# Patient Record
Sex: MALE | Race: WHITE | NOT HISPANIC OR LATINO | Employment: FULL TIME | URBAN - NONMETROPOLITAN AREA
[De-identification: names, ages, dates, MRNs, and addresses within clinical notes are randomized per-mention and may not be internally consistent; named-entity substitution may affect disease eponyms.]

---

## 2020-09-04 ENCOUNTER — TELEMEDICINE (OUTPATIENT)
Dept: FAMILY MEDICINE CLINIC | Facility: CLINIC | Age: 25
End: 2020-09-04

## 2020-09-04 ENCOUNTER — TELEPHONE (OUTPATIENT)
Dept: FAMILY MEDICINE CLINIC | Facility: CLINIC | Age: 25
End: 2020-09-04

## 2020-09-04 VITALS — TEMPERATURE: 99.1 F | BODY MASS INDEX: 18 KG/M2 | HEIGHT: 71 IN | WEIGHT: 128.6 LBS

## 2020-09-04 DIAGNOSIS — R06.2 WHEEZING: ICD-10-CM

## 2020-09-04 DIAGNOSIS — J01.00 ACUTE NON-RECURRENT MAXILLARY SINUSITIS: Primary | ICD-10-CM

## 2020-09-04 DIAGNOSIS — R06.02 SHORTNESS OF BREATH: ICD-10-CM

## 2020-09-04 PROCEDURE — 99214 OFFICE O/P EST MOD 30 MIN: CPT | Performed by: NURSE PRACTITIONER

## 2020-09-04 RX ORDER — PREDNISONE 20 MG/1
20 TABLET ORAL DAILY
Qty: 5 TABLET | Refills: 0 | Status: SHIPPED | OUTPATIENT
Start: 2020-09-04 | End: 2020-09-09

## 2020-09-04 RX ORDER — BENZONATATE 100 MG/1
100 CAPSULE ORAL 3 TIMES DAILY PRN
Qty: 40 CAPSULE | Refills: 0 | Status: SHIPPED | OUTPATIENT
Start: 2020-09-04 | End: 2020-12-01

## 2020-09-04 RX ORDER — AZITHROMYCIN 250 MG/1
TABLET, FILM COATED ORAL
Qty: 6 TABLET | Refills: 0 | Status: SHIPPED | OUTPATIENT
Start: 2020-09-04 | End: 2020-12-01

## 2020-09-04 RX ORDER — ALBUTEROL SULFATE 90 UG/1
2 AEROSOL, METERED RESPIRATORY (INHALATION) EVERY 4 HOURS PRN
Qty: 1 G | Refills: 0 | Status: SHIPPED | OUTPATIENT
Start: 2020-09-04 | End: 2020-12-01

## 2020-09-04 NOTE — PATIENT INSTRUCTIONS
Sinusitis, Adult  Sinusitis is inflammation of your sinuses. Sinuses are hollow spaces in the bones around your face. Your sinuses are located:  · Around your eyes.  · In the middle of your forehead.  · Behind your nose.  · In your cheekbones.  Mucus normally drains out of your sinuses. When your nasal tissues become inflamed or swollen, mucus can become trapped or blocked. This allows bacteria, viruses, and fungi to grow, which leads to infection. Most infections of the sinuses are caused by a virus.  Sinusitis can develop quickly. It can last for up to 4 weeks (acute) or for more than 12 weeks (chronic). Sinusitis often develops after a cold.  What are the causes?  This condition is caused by anything that creates swelling in the sinuses or stops mucus from draining. This includes:  · Allergies.  · Asthma.  · Infection from bacteria or viruses.  · Deformities or blockages in your nose or sinuses.  · Abnormal growths in the nose (nasal polyps).  · Pollutants, such as chemicals or irritants in the air.  · Infection from fungi (rare).  What increases the risk?  You are more likely to develop this condition if you:  · Have a weak body defense system (immune system).  · Do a lot of swimming or diving.  · Overuse nasal sprays.  · Smoke.  What are the signs or symptoms?  The main symptoms of this condition are pain and a feeling of pressure around the affected sinuses. Other symptoms include:  · Stuffy nose or congestion.  · Thick drainage from your nose.  · Swelling and warmth over the affected sinuses.  · Headache.  · Upper toothache.  · A cough that may get worse at night.  · Extra mucus that collects in the throat or the back of the nose (postnasal drip).  · Decreased sense of smell and taste.  · Fatigue.  · A fever.  · Sore throat.  · Bad breath.  How is this diagnosed?  This condition is diagnosed based on:  · Your symptoms.  · Your medical history.  · A physical exam.  · Tests to find out if your condition is  acute or chronic. This may include:  ? Checking your nose for nasal polyps.  ? Viewing your sinuses using a device that has a light (endoscope).  ? Testing for allergies or bacteria.  ? Imaging tests, such as an MRI or CT scan.  In rare cases, a bone biopsy may be done to rule out more serious types of fungal sinus disease.  How is this treated?  Treatment for sinusitis depends on the cause and whether your condition is chronic or acute.  · If caused by a virus, your symptoms should go away on their own within 10 days. You may be given medicines to relieve symptoms. They include:  ? Medicines that shrink swollen nasal passages (topical intranasal decongestants).  ? Medicines that treat allergies (antihistamines).  ? A spray that eases inflammation of the nostrils (topical intranasal corticosteroids).  ? Rinses that help get rid of thick mucus in your nose (nasal saline washes).  · If caused by bacteria, your health care provider may recommend waiting to see if your symptoms improve. Most bacterial infections will get better without antibiotic medicine. You may be given antibiotics if you have:  ? A severe infection.  ? A weak immune system.  · If caused by narrow nasal passages or nasal polyps, you may need to have surgery.  Follow these instructions at home:  Medicines  · Take, use, or apply over-the-counter and prescription medicines only as told by your health care provider. These may include nasal sprays.  · If you were prescribed an antibiotic medicine, take it as told by your health care provider. Do not stop taking the antibiotic even if you start to feel better.  Hydrate and humidify    · Drink enough fluid to keep your urine pale yellow. Staying hydrated will help to thin your mucus.  · Use a cool mist humidifier to keep the humidity level in your home above 50%.  · Inhale steam for 10-15 minutes, 3-4 times a day, or as told by your health care provider. You can do this in the bathroom while a hot shower is  running.  · Limit your exposure to cool or dry air.  Rest  · Rest as much as possible.  · Sleep with your head raised (elevated).  · Make sure you get enough sleep each night.  General instructions    · Apply a warm, moist washcloth to your face 3-4 times a day or as told by your health care provider. This will help with discomfort.  · Wash your hands often with soap and water to reduce your exposure to germs. If soap and water are not available, use hand .  · Do not smoke. Avoid being around people who are smoking (secondhand smoke).  · Keep all follow-up visits as told by your health care provider. This is important.  Contact a health care provider if:  · You have a fever.  · Your symptoms get worse.  · Your symptoms do not improve within 10 days.  Get help right away if:  · You have a severe headache.  · You have persistent vomiting.  · You have severe pain or swelling around your face or eyes.  · You have vision problems.  · You develop confusion.  · Your neck is stiff.  · You have trouble breathing.  Summary  · Sinusitis is soreness and inflammation of your sinuses. Sinuses are hollow spaces in the bones around your face.  · This condition is caused by nasal tissues that become inflamed or swollen. The swelling traps or blocks the flow of mucus. This allows bacteria, viruses, and fungi to grow, which leads to infection.  · If you were prescribed an antibiotic medicine, take it as told by your health care provider. Do not stop taking the antibiotic even if you start to feel better.  · Keep all follow-up visits as told by your health care provider. This is important.  This information is not intended to replace advice given to you by your health care provider. Make sure you discuss any questions you have with your health care provider.  Document Released: 12/18/2006 Document Revised: 05/20/2019 Document Reviewed: 05/20/2019  Elsevier Patient Education © 2020 [employer]

## 2020-09-04 NOTE — TELEPHONE ENCOUNTER
PATIENT CALLING IN REQUESTING ALL OF THE MEDICATIONS HE WAS GIVEN TODAY GO TO [facility] [address] [place] [address] [id number] - [phone] [facility] [phone]   [phone]    OTHER PHARMACY DOSE NOT HAVE THE REQUEST.

## 2020-09-04 NOTE — PROGRESS NOTES
CC: Right earache, jaw pain, sinus pain and pressure.       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  Has tried benadryl and zyrtec/clariten with little improvement.  Says he does dip every none and then. Says that as a child had tubes several times and recurrent ear infections.  Has not been blowing anything out of nose, cheeks are sore and tender.  He does admit to dipping some.  Pt says that he has not been exposed to covid since he was tested in August which was negative.          The following portions of the patient's history were reviewed and updated as appropriate: allergies, current medications, past family history, past medical history, past social history, past surgical history and problem list.    Review of Systems   Constitutional: Positive for activity change, appetite change and fever.   HENT: Positive for congestion, ear pain, postnasal drip and sinus pressure. Negative for sore throat and swollen glands.    Eyes: Negative.    Respiratory: Positive for shortness of breath and wheezing. Negative for cough.    Musculoskeletal: Negative.    Skin: Negative.    Allergic/Immunologic: Negative.    Neurological: Negative.    Hematological: Negative.    Psychiatric/Behavioral: Negative.    All other systems reviewed and are negative.      Objective      Physical exam-  · General appearance- Alert, appears comfortable. Dressed appropriately. No distress.   · HEENT- external examination of eyes, ears and nose all normal. Head is atraumatic. Hearing normal.  Taps on cheeks says that hurts both sides, says it hurts under his right ear into the jaw.    · Neck is symmetric, trachea midline.   · Normal respiratory effort.   · Digits and nails appear healthy. No clubbing, rashes or discolorations.   · Normal range of motion of all extremities.    · Mood appropriate. Communicates easily. Normal judgement and insight. Oriented to time, place and person. Memory appears intact.   · Says he is wheezing           Assessment/Plan   [place] was seen today for shortness of breath, sinus problem and earache.    Diagnoses and all orders for this visit:    Acute non-recurrent maxillary sinusitis  -     azithromycin (ZITHROMAX) 250 MG tablet; Take 2 tablets the first day, then 1 tablet daily for 4 days.    Wheezing  -     benzonatate (Tessalon Perles) 100 MG capsule; Take 1 capsule by mouth 3 (Three) Times a Day As Needed for Cough.  -     predniSONE (DELTASONE) 20 MG tablet; Take 1 tablet by mouth Daily for 5 days.    Shortness of breath  -     albuterol sulfate  (90 Base) MCG/ACT inhaler; Inhale 2 puffs Every 4 (Four) Hours As Needed for Wheezing.      This was an audio and video enabled telemedicine encounter.    Return in about 1 week (around 9/11/2020), or if symptoms worsen or fail to improve.    Electronically signed by Swetha Jules, AMENA, APRN, 09/04/20, 3:44 PM.

## 2020-12-01 ENCOUNTER — TELEMEDICINE (OUTPATIENT)
Dept: FAMILY MEDICINE CLINIC | Facility: CLINIC | Age: 25
End: 2020-12-01

## 2020-12-01 ENCOUNTER — TELEPHONE (OUTPATIENT)
Dept: FAMILY MEDICINE CLINIC | Facility: CLINIC | Age: 25
End: 2020-12-01

## 2020-12-01 VITALS — HEIGHT: 71 IN | TEMPERATURE: 98.6 F | WEIGHT: 128.6 LBS | BODY MASS INDEX: 18 KG/M2

## 2020-12-01 DIAGNOSIS — Z20.822 CLOSE EXPOSURE TO COVID-19 VIRUS: Primary | ICD-10-CM

## 2020-12-01 PROCEDURE — 99213 OFFICE O/P EST LOW 20 MIN: CPT | Performed by: NURSE PRACTITIONER

## 2020-12-01 NOTE — TELEPHONE ENCOUNTER
Caller: Jenni Phelan    Relationship to patient: Emergency Contact    Best call back number: [phone]    Concerns or Questions if Applicable: PATIENT WOULD LIKE TO KNOW IF HE NEEDS A TEST OR IF HE NEEDS TO QUARANTINE     Travel screen questions: EXPOSURE TO WIFE WHO HAS COVID.

## 2020-12-02 ENCOUNTER — CLINICAL SUPPORT (OUTPATIENT)
Dept: FAMILY MEDICINE CLINIC | Facility: CLINIC | Age: 25
End: 2020-12-02

## 2020-12-02 DIAGNOSIS — Z20.822 CLOSE EXPOSURE TO COVID-19 VIRUS: ICD-10-CM

## 2020-12-04 LAB — SARS-COV-2 RNA RESP QL NAA+PROBE: DETECTED

## 2022-03-07 ENCOUNTER — OFFICE VISIT (OUTPATIENT)
Dept: FAMILY MEDICINE CLINIC | Facility: CLINIC | Age: 27
End: 2022-03-07

## 2022-03-07 VITALS
DIASTOLIC BLOOD PRESSURE: 79 MMHG | TEMPERATURE: 97.2 F | BODY MASS INDEX: 18.9 KG/M2 | RESPIRATION RATE: 18 BRPM | OXYGEN SATURATION: 98 % | HEIGHT: 71 IN | SYSTOLIC BLOOD PRESSURE: 149 MMHG | WEIGHT: 135 LBS | HEART RATE: 81 BPM

## 2022-03-07 DIAGNOSIS — Z53.21 PATIENT LEFT WITHOUT BEING SEEN: Primary | ICD-10-CM

## 2022-03-07 NOTE — PROGRESS NOTES
"Chief Complaint  Annual Exam and Discuss Semen Analysis     Patient left without being seen          Subjective    History of Present Illness      Patient presents to [facility] for   anual exam and to discuss semen analysis. He also complains of bilateral knee pain, bilateral wrist pain, bilateral ankle pain, neck and back pain.        Review of Systems   Constitutional: Negative.    HENT: Negative.    Eyes: Negative.    Respiratory: Negative.    Cardiovascular: Negative.    Gastrointestinal: Negative.    Endocrine: Negative.    Genitourinary: Negative.    Musculoskeletal: Positive for arthralgias, back pain, myalgias and neck pain.   Skin: Negative.    Allergic/Immunologic: Negative.    Neurological: Negative.    Hematological: Negative.    Psychiatric/Behavioral: Negative.          Objective   Vital Signs:   /79   Pulse 81   Temp 97.2 °F (36.2 °C)   Resp 18   Ht 180.3 cm (71\")   Wt 61.2 kg (135 lb)   SpO2 98%   BMI 18.83 kg/m²           Physical Exam     Result Review  Data Reviewed:                   Assessment and Plan      Problem List Items Addressed This Visit    None     Visit Diagnoses     Patient left without being seen    -  Primary              Follow Up   No follow-ups on file.  Patient was given instructions and counseling regarding his condition or for health maintenance advice. Please see specific information pulled into the AVS if appropriate.       "

## 2022-05-02 ENCOUNTER — OFFICE VISIT (OUTPATIENT)
Dept: FAMILY MEDICINE CLINIC | Facility: CLINIC | Age: 27
End: 2022-05-02

## 2022-05-02 VITALS
BODY MASS INDEX: 18.62 KG/M2 | RESPIRATION RATE: 16 BRPM | SYSTOLIC BLOOD PRESSURE: 118 MMHG | DIASTOLIC BLOOD PRESSURE: 72 MMHG | OXYGEN SATURATION: 99 % | HEART RATE: 83 BPM | WEIGHT: 133 LBS | TEMPERATURE: 98.2 F | HEIGHT: 71 IN

## 2022-05-02 DIAGNOSIS — N46.9 INFERTILITY MALE: ICD-10-CM

## 2022-05-02 DIAGNOSIS — F32.A ANXIETY AND DEPRESSION: Primary | ICD-10-CM

## 2022-05-02 DIAGNOSIS — B07.0 PLANTAR WART: ICD-10-CM

## 2022-05-02 DIAGNOSIS — Z13.0 SCREENING FOR DEFICIENCY ANEMIA: ICD-10-CM

## 2022-05-02 DIAGNOSIS — Z13.1 DIABETES MELLITUS SCREENING: ICD-10-CM

## 2022-05-02 DIAGNOSIS — F41.9 ANXIETY AND DEPRESSION: Primary | ICD-10-CM

## 2022-05-02 DIAGNOSIS — M79.10 MYALGIA: ICD-10-CM

## 2022-05-02 DIAGNOSIS — Z13.220 LIPID SCREENING: ICD-10-CM

## 2022-05-02 PROCEDURE — 99214 OFFICE O/P EST MOD 30 MIN: CPT | Performed by: FAMILY MEDICINE

## 2022-05-02 PROCEDURE — 17110 DESTRUCTION B9 LES UP TO 14: CPT | Performed by: FAMILY MEDICINE

## 2022-05-02 RX ORDER — FLUOXETINE HYDROCHLORIDE 20 MG/1
20 CAPSULE ORAL DAILY
Qty: 90 CAPSULE | Refills: 0 | Status: SHIPPED | OUTPATIENT
Start: 2022-05-02 | End: 2022-07-29

## 2022-05-02 NOTE — PROGRESS NOTES
"Subjective cc: est care for HTN  [place] Lu[place] Phelan is a 26 y.o. male.     History of Present Illness   Terrence Phelan, date of birth 1995, presents today with a checklist to evaluate regarding blood pressure, mood stabilizer, semen analysis. He also has some places on his hand, elbow and his calf. The patient states he has pain on extending his foot and is unsure if it is a muscle issue or shin splints. He reports he is working a new job and is unsure if it is related to that.    His blood pressure today is 118/72 mmHg, but has been elevated in the past. He reports hypertension runs in his family and would like to keep a check on it.     Regarding semen analysis, he reports they have been trying to get pregnant for 2.5 years. His wife has never had a fertility test. He would like a test be setup for him. He and his wife have a child but he is unsure if it is biologically his.     The patient reports he has a wart on the middle finger and on the palm of his left hand that he is constantly hitting and causing pain and irritation. He would like to have them removed.      The patient reports an over-stretched ligament-type pain in his calf. When he extends his foot it causes pain. It only hurts when he is walking around. He has had this pain for 3.5 years randomly and thought it might be dehydration. 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     He reports depression and anxiety and going from 0 to 100 over little things. The patient reports he had a traumatic childhood, but it has worsened in the last 2 years after finding out about his wife's affair. He reports depression and really bad mood swings. The patient reports he was on a mood stabilizer in the past through the [facility], but cannot recall the name of the medicine, but notes it increased his temper. He reports thoughts of harming himself but only when it is \"bad, bad,\" and " "that could be once a month or once every 3 months. The patient is not currently having thoughts of harming himself. He denies any suicide attempts in the past or self harm. The patient is not currently seeing a counselor, but has seen one in the past, but did not find it helpful. He is agreeable to taking Prozac.    He is fasting today for lab studies.    The following portions of the patient's history were reviewed and updated as appropriate: allergies, current medications, past family history, past medical history, past social history, past surgical history and problem list.        Review of Systems   Musculoskeletal: Positive for myalgias.   Skin:        wart   Psychiatric/Behavioral: Positive for agitation and dysphoric mood. The patient is nervous/anxious.    All other systems reviewed and are negative.      Objective   Blood pressure 118/72, pulse 83, temperature 98.2 °F (36.8 °C), temperature source Infrared, resp. rate 16, height 180.3 cm (71\"), weight 60.3 kg (133 lb), SpO2 99 %.  Physical Exam  Vitals and nursing note reviewed.   Constitutional:       General: He is not in acute distress.     Appearance: He is well-developed. He is not diaphoretic.   HENT:      Head: Normocephalic and atraumatic.      Right Ear: External ear normal.      Left Ear: External ear normal.      Nose: Nose normal.   Eyes:      General:         Right eye: No discharge.         Left eye: No discharge.      Conjunctiva/sclera: Conjunctivae normal.   Neck:      Thyroid: No thyromegaly.      Trachea: No tracheal deviation.   Cardiovascular:      Rate and Rhythm: Normal rate and regular rhythm.      Heart sounds: Normal heart sounds.   Pulmonary:      Effort: Pulmonary effort is normal. No respiratory distress.      Breath sounds: Normal breath sounds. No stridor. No wheezing.   Chest:      Chest wall: No tenderness.   Abdominal:      General: There is no distension.      Palpations: Abdomen is soft.      Tenderness: There is no " abdominal tenderness.   Musculoskeletal:         General: Normal range of motion.        Hands:       Cervical back: Normal range of motion.   Lymphadenopathy:      Cervical: No cervical adenopathy.   Skin:     General: Skin is warm and dry.   Neurological:      Mental Status: He is alert and oriented to person, place, and time.      Motor: No abnormal muscle tone.      Coordination: Coordination normal.   Psychiatric:         Behavior: Behavior normal.         Thought Content: Thought content normal.         Judgment: Judgment normal.     Cryotherapy, Skin Lesion    Date/Time: 5/2/2022 5:10 PM  Performed by: Eliane Victoria MD  Authorized by: Eliane Victoria MD   Consent: Verbal consent obtained. Written consent obtained.  Risks and benefits: risks, benefits and alternatives were discussed  Consent given by: patient  Patient understanding: patient states understanding of the procedure being performed  Patient consent: the patient's understanding of the procedure matches consent given  Procedure consent: procedure consent matches procedure scheduled  Patient identity confirmed: verbally with patient  Local anesthesia used: no    Anesthesia:  Local anesthesia used: no    Sedation:  Patient sedated: no    Patient tolerance: patient tolerated the procedure well with no immediate complications  Comments: Liquid nitrogen application x 3 with complete freeze and thaw between each             Assessment/Plan   Problems Addressed this Visit        Mental Health    Anxiety and depression - Primary    Relevant Medications    FLUoxetine (PROzac) 20 MG capsule    Other Relevant Orders    TSH    T4, Free      Other Visit Diagnoses     Infertility male        Relevant Orders    Semen Analysis - Semen, Voided    Testosterone    Plantar wart        Myalgia        Screening for deficiency anemia        Relevant Orders    CBC (No Diff)    Lipid screening        Relevant Orders    Lipid Panel    Diabetes mellitus screening         Relevant Orders    Comprehensive Metabolic Panel      Diagnoses       Codes Comments    Anxiety and depression    -  Primary ICD-10-CM: F41.9, F32.A  ICD-9-CM: 300.00, 311     Infertility male     ICD-10-CM: N46.9  ICD-9-CM: 606.9     Plantar wart     ICD-10-CM: B07.0  ICD-9-CM: 078.12     Myalgia     ICD-10-CM: M79.10  ICD-9-CM: 729.1     Screening for deficiency anemia     ICD-10-CM: Z13.0  ICD-9-CM: V78.1     Lipid screening     ICD-10-CM: Z13.220  ICD-9-CM: V77.91     Diabetes mellitus screening     ICD-10-CM: Z13.1  ICD-9-CM: V77.1         1. Anxiety and depression and agitation: New to me, chronic for patient.   · The patient reports he has been on treatment in the past; however, it was not effective. Discussed treatment options.  · We will start patient on Prozac. Advised patient on risks and benefits of medication. Stop medication and contact the office if he has any negative side effects; otherwise, recheck in 6 weeks for reevaluation.   · The patient does not want counseling.     2. Infertility: New, needs further evaluation.   · The patient is currently trying to get pregnant with his wife. Would like to have semen analysis. Order sent. Advised on collection.     3. Warts: New, needs further treatment.   · The patient would like to have the warts on his left hand burned off in office today.   · We will apply liquid nitrogen. Advised on local wound care.   · Return with any concerns.     4. Myalgia: New.  · We will check electrolytes with CMP.   · Advised patient on good footwear and regular stretching.   · Return if not improving.          Transcribed from ambient dictation for Eliane Victoria MD by Carleen William.  05/02/22   10:44 CDT    Patient verbalized consent to the visit recording.          This document has been electronically signed by Eliane Victoria MD on May 2, 2022 17:11 CDT

## 2022-05-02 NOTE — PATIENT INSTRUCTIONS
"Hypertension, Adult  High blood pressure (hypertension) is when the force of blood pumping through the arteries is too strong. The arteries are the blood vessels that carry blood from the heart throughout the body. Hypertension forces the heart to work harder to pump blood and may cause arteries to become narrow or stiff. Untreated or uncontrolled hypertension can cause a heart attack, heart failure, a stroke, kidney disease, and other problems.  A blood pressure reading consists of a higher number over a lower number. Ideally, your blood pressure should be below 120/80. The first (\"top\") number is called the systolic pressure. It is a measure of the pressure in your arteries as your heart beats. The second (\"bottom\") number is called the diastolic pressure. It is a measure of the pressure in your arteries as the heart relaxes.  What are the causes?  The exact cause of this condition is not known. There are some conditions that result in or are related to high blood pressure.  What increases the risk?  Some risk factors for high blood pressure are under your control. The following factors may make you more likely to develop this condition:  Smoking.  Having type 2 diabetes mellitus, high cholesterol, or both.  Not getting enough exercise or physical activity.  Being overweight.  Having too much fat, sugar, calories, or salt (sodium) in your diet.  Drinking too much alcohol.  Some risk factors for high blood pressure may be difficult or impossible to change. Some of these factors include:  Having chronic kidney disease.  Having a family history of high blood pressure.  Age. Risk increases with age.  Race. You may be at higher risk if you are .  Gender. Men are at higher risk than women before age 45. After age 65, women are at higher risk than men.  Having obstructive sleep apnea.  Stress.  What are the signs or symptoms?  High blood pressure may not cause symptoms. Very high blood pressure " (hypertensive crisis) may cause:  Headache.  Anxiety.  Shortness of breath.  Nosebleed.  Nausea and vomiting.  Vision changes.  Severe chest pain.  Seizures.  How is this diagnosed?  This condition is diagnosed by measuring your blood pressure while you are seated, with your arm resting on a flat surface, your legs uncrossed, and your feet flat on the floor. The cuff of the blood pressure monitor will be placed directly against the skin of your upper arm at the level of your heart. It should be measured at least twice using the same arm. Certain conditions can cause a difference in blood pressure between your right and left arms.  Certain factors can cause blood pressure readings to be lower or higher than normal for a short period of time:  When your blood pressure is higher when you are in a health care provider's office than when you are at home, this is called white coat hypertension. Most people with this condition do not need medicines.  When your blood pressure is higher at home than when you are in a health care provider's office, this is called masked hypertension. Most people with this condition may need medicines to control blood pressure.  If you have a high blood pressure reading during one visit or you have normal blood pressure with other risk factors, you may be asked to:  Return on a different day to have your blood pressure checked again.  Monitor your blood pressure at home for 1 week or longer.  If you are diagnosed with hypertension, you may have other blood or imaging tests to help your health care provider understand your overall risk for other conditions.  How is this treated?  This condition is treated by making healthy lifestyle changes, such as eating healthy foods, exercising more, and reducing your alcohol intake. Your health care provider may prescribe medicine if lifestyle changes are not enough to get your blood pressure under control, and if:  Your systolic blood pressure is above  130.  Your diastolic blood pressure is above 80.  Your personal target blood pressure may vary depending on your medical conditions, your age, and other factors.  Follow these instructions at home:  Eating and drinking    Eat a diet that is high in fiber and potassium, and low in sodium, added sugar, and fat. An example eating plan is called the DASH (Dietary Approaches to Stop Hypertension) diet. To eat this way:  Eat plenty of fresh fruits and vegetables. Try to fill one half of your plate at each meal with fruits and vegetables.  Eat whole grains, such as whole-wheat pasta, brown rice, or whole-grain bread. Fill about one fourth of your plate with whole grains.  Eat or drink low-fat dairy products, such as skim milk or low-fat yogurt.  Avoid fatty cuts of meat, processed or cured meats, and poultry with skin. Fill about one fourth of your plate with lean proteins, such as fish, chicken without skin, beans, eggs, or tofu.  Avoid pre-made and processed foods. These tend to be higher in sodium, added sugar, and fat.  Reduce your daily sodium intake. Most people with hypertension should eat less than 1,500 mg of sodium a day.  Do not drink alcohol if:  Your health care provider tells you not to drink.  You are pregnant, may be pregnant, or are planning to become pregnant.  If you drink alcohol:  Limit how much you use to:  0-1 drink a day for women.  0-2 drinks a day for men.  Be aware of how much alcohol is in your drink. In the [place] one drink equals one 12 oz bottle of beer (355 mL), one 5 oz glass of wine (148 mL), or one 1½ oz glass of hard liquor (44 mL).    Lifestyle    Work with your health care provider to maintain a healthy body weight or to lose weight. Ask what an ideal weight is for you.  Get at least 30 minutes of exercise most days of the week. Activities may include walking, swimming, or biking.  Include exercise to strengthen your muscles (resistance exercise), such as Pilates or lifting weights, as  part of your weekly exercise routine. Try to do these types of exercises for 30 minutes at least 3 days a week.  Do not use any products that contain nicotine or tobacco, such as cigarettes, e-cigarettes, and chewing tobacco. If you need help quitting, ask your health care provider.  Monitor your blood pressure at home as told by your health care provider.  Keep all follow-up visits as told by your health care provider. This is important.    Medicines  Take over-the-counter and prescription medicines only as told by your health care provider. Follow directions carefully. Blood pressure medicines must be taken as prescribed.  Do not skip doses of blood pressure medicine. Doing this puts you at risk for problems and can make the medicine less effective.  Ask your health care provider about side effects or reactions to medicines that you should watch for.  Contact a health care provider if you:  Think you are having a reaction to a medicine you are taking.  Have headaches that keep coming back (recurring).  Feel dizzy.  Have swelling in your ankles.  Have trouble with your vision.  Get help right away if you:  Develop a severe headache or confusion.  Have unusual weakness or numbness.  Feel faint.  Have severe pain in your chest or abdomen.  Vomit repeatedly.  Have trouble breathing.  Summary  Hypertension is when the force of blood pumping through your arteries is too strong. If this condition is not controlled, it may put you at risk for serious complications.  Your personal target blood pressure may vary depending on your medical conditions, your age, and other factors. For most people, a normal blood pressure is less than 120/80.  Hypertension is treated with lifestyle changes, medicines, or a combination of both. Lifestyle changes include losing weight, eating a healthy, low-sodium diet, exercising more, and limiting alcohol.  This information is not intended to replace advice given to you by your health care  "provider. Make sure you discuss any questions you have with your health care provider.  Document Revised: 08/28/2019 Document Reviewed: 08/28/2019  [employer] Patient Education © 2021 [employer]  https://www.nhlbi.nih.gov/files/docs/public/heart/dash_brief.pdf\">   DASH Eating Plan  DASH stands for Dietary Approaches to Stop Hypertension. The DASH eating plan is a healthy eating plan that has been shown to:  Reduce high blood pressure (hypertension).  Reduce your risk for type 2 diabetes, heart disease, and stroke.  Help with weight loss.  What are tips for following this plan?  Reading food labels  Check food labels for the amount of salt (sodium) per serving. Choose foods with less than 5 percent of the Daily Value of sodium. Generally, foods with less than 300 milligrams (mg) of sodium per serving fit into this eating plan.  To find whole grains, look for the word \"whole\" as the first word in the ingredient list.  Shopping  Buy products labeled as \"low-sodium\" or \"no salt added.\"  Buy fresh foods. Avoid canned foods and pre-made or frozen meals.  Cooking  Avoid adding salt when cooking. Use salt-free seasonings or herbs instead of table salt or sea salt. Check with your health care provider or pharmacist before using salt substitutes.  Do not powers foods. Cook foods using healthy methods such as baking, boiling, grilling, roasting, and broiling instead.  Cook with heart-healthy oils, such as olive, canola, avocado, soybean, or sunflower oil.  Meal planning    Eat a balanced diet that includes:  4 or more servings of fruits and 4 or more servings of vegetables each day. Try to fill one-half of your plate with fruits and vegetables.  6-8 servings of whole grains each day.  Less than 6 oz (170 g) of lean meat, poultry, or fish each day. A 3-oz (85-g) serving of meat is about the same size as a deck of cards. One egg equals 1 oz (28 g).  2-3 servings of low-fat dairy each day. One serving is 1 cup (237 mL).  1 serving " of nuts, seeds, or beans 5 times each week.  2-3 servings of heart-healthy fats. Healthy fats called omega-3 fatty acids are found in foods such as walnuts, flaxseeds, fortified milks, and eggs. These fats are also found in cold-water fish, such as sardines, salmon, and mackerel.  Limit how much you eat of:  Canned or prepackaged foods.  Food that is high in trans fat, such as some fried foods.  Food that is high in saturated fat, such as fatty meat.  Desserts and other sweets, sugary drinks, and other foods with added sugar.  Full-fat dairy products.  Do not salt foods before eating.  Do not eat more than 4 egg yolks a week.  Try to eat at least 2 vegetarian meals a week.  Eat more home-cooked food and less restaurant, buffet, and fast food.    Lifestyle  When eating at a restaurant, ask that your food be prepared with less salt or no salt, if possible.  If you drink alcohol:  Limit how much you use to:  0-1 drink a day for women who are not pregnant.  0-2 drinks a day for men.  Be aware of how much alcohol is in your drink. In the [place] one drink equals one 12 oz bottle of beer (355 mL), one 5 oz glass of wine (148 mL), or one 1½ oz glass of hard liquor (44 mL).  General information  Avoid eating more than 2,300 mg of salt a day. If you have hypertension, you may need to reduce your sodium intake to 1,500 mg a day.  Work with your health care provider to maintain a healthy body weight or to lose weight. Ask what an ideal weight is for you.  Get at least 30 minutes of exercise that causes your heart to beat faster (aerobic exercise) most days of the week. Activities may include walking, swimming, or biking.  Work with your health care provider or dietitian to adjust your eating plan to your individual calorie needs.  What foods should I eat?  Fruits  All fresh, dried, or frozen fruit. Canned fruit in natural juice (without added sugar).  Vegetables  Fresh or frozen vegetables (raw, steamed, roasted, or grilled).  Low-sodium or reduced-sodium tomato and vegetable juice. Low-sodium or reduced-sodium tomato sauce and tomato paste. Low-sodium or reduced-sodium canned vegetables.  Grains  Whole-grain or whole-wheat bread. Whole-grain or whole-wheat pasta. Brown rice. Oatmeal. Quinoa. Bulgur. Whole-grain and low-sodium cereals. Richa bread. Low-fat, low-sodium crackers. Whole-wheat flour tortillas.  Meats and other proteins  Skinless chicken or turkey. Ground chicken or turkey. Pork with fat trimmed off. Fish and seafood. Egg whites. Dried beans, peas, or lentils. Unsalted nuts, nut butters, and seeds. Unsalted canned beans. Lean cuts of beef with fat trimmed off. Low-sodium, lean precooked or cured meat, such as sausages or meat loaves.  Dairy  Low-fat (1%) or fat-free (skim) milk. Reduced-fat, low-fat, or fat-free cheeses. Nonfat, low-sodium ricotta or cottage cheese. Low-fat or nonfat yogurt. Low-fat, low-sodium cheese.  Fats and oils  Soft margarine without trans fats. Vegetable oil. Reduced-fat, low-fat, or light mayonnaise and salad dressings (reduced-sodium). Canola, safflower, olive, avocado, soybean, and sunflower oils. Avocado.  Seasonings and condiments  Herbs. Spices. Seasoning mixes without salt.  Other foods  Unsalted popcorn and pretzels. Fat-free sweets.  The items listed above may not be a complete list of foods and beverages you can eat. Contact a dietitian for more information.  What foods should I avoid?  Fruits  Canned fruit in a light or heavy syrup. Fried fruit. Fruit in cream or butter sauce.  Vegetables  Creamed or fried vegetables. Vegetables in a cheese sauce. Regular canned vegetables (not low-sodium or reduced-sodium). Regular canned tomato sauce and paste (not low-sodium or reduced-sodium). Regular tomato and vegetable juice (not low-sodium or reduced-sodium). Pickles. Olives.  Grains  Baked goods made with fat, such as croissants, muffins, or some breads. Dry pasta or rice meal packs.  Meats and other  proteins  Fatty cuts of meat. Ribs. Fried meat. Tim. Bologna, salami, and other precooked or cured meats, such as sausages or meat loaves. Fat from the back of a pig (fatback). Bratwurst. Salted nuts and seeds. Canned beans with added salt. Canned or smoked fish. Whole eggs or egg yolks. Chicken or turkey with skin.  Dairy  Whole or 2% milk, cream, and half-and-half. Whole or full-fat cream cheese. Whole-fat or sweetened yogurt. Full-fat cheese. Nondairy creamers. Whipped toppings. Processed cheese and cheese spreads.  Fats and oils  Butter. Stick margarine. Lard. Shortening. Ghee. Tim fat. Tropical oils, such as coconut, palm kernel, or palm oil.  Seasonings and condiments  Onion salt, garlic salt, seasoned salt, table salt, and sea salt. Worcestershire sauce. Tartar sauce. Barbecue sauce. Teriyaki sauce. Soy sauce, including reduced-sodium. Steak sauce. Canned and packaged gravies. Fish sauce. Oyster sauce. Cocktail sauce. Store-bought horseradish. Ketchup. Mustard. Meat flavorings and tenderizers. Bouillon cubes. Hot sauces. Pre-made or packaged marinades. Pre-made or packaged taco seasonings. Relishes. Regular salad dressings.  Other foods  Salted popcorn and pretzels.  The items listed above may not be a complete list of foods and beverages you should avoid. Contact a dietitian for more information.  Where to find more information  National Heart, Lung, and Blood [place]: www.nhlbi.nih.gov  American Heart Association: www.heart.org  Academy of Nutrition and Dietetics: www.eatright.org  National Kidney Foundation: www.kidney.org  Summary  The DASH eating plan is a healthy eating plan that has been shown to reduce high blood pressure (hypertension). It may also reduce your risk for type 2 diabetes, heart disease, and stroke.  When on the DASH eating plan, aim to eat more fresh fruits and vegetables, whole grains, lean proteins, low-fat dairy, and heart-healthy fats.  With the DASH eating plan, you should  limit salt (sodium) intake to 2,300 mg a day. If you have hypertension, you may need to reduce your sodium intake to 1,500 mg a day.  Work with your health care provider or dietitian to adjust your eating plan to your individual calorie needs.  This information is not intended to replace advice given to you by your health care provider. Make sure you discuss any questions you have with your health care provider.  Document Revised: 11/20/2020 Document Reviewed: 11/20/2020  [employer]vier Patient Education © 2021 [employer]

## 2022-05-03 ENCOUNTER — LAB (OUTPATIENT)
Dept: LAB | Facility: HOSPITAL | Age: 27
End: 2022-05-03

## 2022-05-03 DIAGNOSIS — N46.9 INFERTILITY MALE: ICD-10-CM

## 2022-05-03 LAB
ALBUMIN SERPL-MCNC: 4.8 G/DL (ref 3.5–5.2)
ALBUMIN/GLOB SERPL: 2 G/DL
ALP SERPL-CCNC: 68 U/L (ref 39–117)
ALT SERPL-CCNC: 13 U/L (ref 1–41)
AST SERPL-CCNC: 21 U/L (ref 1–40)
BILIRUB SERPL-MCNC: 1.6 MG/DL (ref 0–1.2)
BUN SERPL-MCNC: 13 MG/DL (ref 6–20)
BUN/CREAT SERPL: 14.1 (ref 7–25)
CALCIUM SERPL-MCNC: 9.8 MG/DL (ref 8.6–10.5)
CHARACTER SMN: ABNORMAL
CHLORIDE SERPL-SCNC: 101 MMOL/L (ref 98–107)
CHOLEST SERPL-MCNC: 169 MG/DL (ref 0–200)
CO2 SERPL-SCNC: 26.2 MMOL/L (ref 22–29)
COLLECTION METHOD SMN: ABNORMAL
CREAT SERPL-MCNC: 0.92 MG/DL (ref 0.76–1.27)
EGFRCR SERPLBLD CKD-EPI 2021: 118.4 ML/MIN/1.73
EPI CELLS #/AREA SMN HPF: ABNORMAL /HPF
ERYTHROCYTE [DISTWIDTH] IN BLOOD BY AUTOMATED COUNT: 12.4 % (ref 12.3–15.4)
GLOBULIN SER CALC-MCNC: 2.4 GM/DL
GLUCOSE SERPL-MCNC: 96 MG/DL (ref 65–99)
HCT VFR BLD AUTO: 46.6 % (ref 37.5–51)
HDLC SERPL-MCNC: 52 MG/DL (ref 40–60)
HGB BLD-MCNC: 16.1 G/DL (ref 13–17.7)
LDLC SERPL CALC-MCNC: 99 MG/DL (ref 0–100)
LIQUEFACTION TIME SMN: NORMAL MIN
MCH RBC QN AUTO: 30.4 PG (ref 26.6–33)
MCHC RBC AUTO-ENTMCNC: 34.5 G/DL (ref 31.5–35.7)
MCV RBC AUTO: 88.1 FL (ref 79–97)
NON PROGRESSIVE MOTILITY: 43.7 %
PLATELET # BLD AUTO: 209 10*3/MM3 (ref 140–450)
POTASSIUM SERPL-SCNC: 4.6 MMOL/L (ref 3.5–5.2)
PROT SERPL-MCNC: 7.2 G/DL (ref 6–8.5)
RBC # BLD AUTO: 5.29 10*6/MM3 (ref 4.14–5.8)
RBC #/AREA SMN HPF: ABNORMAL /HPF
SEX ABSTIN DURATION TIME PATIENT: 2 DAYS
SLOW PROGRESSIVE MOTILITY: 0 %
SMI: 3 (ref 80–400)
SODIUM SERPL-SCNC: 140 MMOL/L (ref 136–145)
SPEC CONTAINER SPEC: ABNORMAL
SPECIMEN VOL SMN: 6 ML
SPERM # SMN: 5.5 MILLIONS/ML
SPERM IMMOTILE NFR SMN: 52.7 %
SPERM MOTILE NFR SMN: 47.3 % MOTILE (ref 50–100)
SPERM PROG NFR SMN: 3.6 % (ref 25–100)
SPERM SMN: 2.7 % NORMAL (ref 15–100)
T4 FREE SERPL-MCNC: 1.31 NG/DL (ref 0.93–1.7)
TESTOST SERPL-MCNC: 528 NG/DL (ref 264–916)
TRIGL SERPL-MCNC: 96 MG/DL (ref 0–150)
TSH SERPL DL<=0.005 MIU/L-ACNC: 3.06 UIU/ML (ref 0.27–4.2)
VISC SMN: NORMAL CP
VLDLC SERPL CALC-MCNC: 18 MG/DL (ref 5–40)
WBC # BLD AUTO: 5.54 10*3/MM3 (ref 3.4–10.8)
WBC SMN QL: ABNORMAL /HPF
WHO STANDARD: ABNORMAL

## 2022-05-03 PROCEDURE — 89320 SEMEN ANAL VOL/COUNT/MOT: CPT

## 2022-05-05 ENCOUNTER — OFFICE VISIT (OUTPATIENT)
Dept: FAMILY MEDICINE CLINIC | Facility: CLINIC | Age: 27
End: 2022-05-05

## 2022-05-05 VITALS
WEIGHT: 131.8 LBS | HEART RATE: 88 BPM | DIASTOLIC BLOOD PRESSURE: 75 MMHG | RESPIRATION RATE: 16 BRPM | BODY MASS INDEX: 18.45 KG/M2 | SYSTOLIC BLOOD PRESSURE: 118 MMHG | OXYGEN SATURATION: 99 % | HEIGHT: 71 IN | TEMPERATURE: 98.2 F

## 2022-05-05 DIAGNOSIS — F32.A ANXIETY AND DEPRESSION: ICD-10-CM

## 2022-05-05 DIAGNOSIS — N46.9 INFERTILITY MALE: Primary | ICD-10-CM

## 2022-05-05 DIAGNOSIS — F41.9 ANXIETY AND DEPRESSION: ICD-10-CM

## 2022-05-05 DIAGNOSIS — R86.9 ABNORMAL SEMEN ANALYSIS: ICD-10-CM

## 2022-05-05 PROCEDURE — 99213 OFFICE O/P EST LOW 20 MIN: CPT | Performed by: NURSE PRACTITIONER

## 2022-05-05 NOTE — PROGRESS NOTES
"Chief Complaint  Results (Here to discuss results )    Subjective          [place] Luigi Phelan presents to [facility]  History of Present Illness  Patient presents to the clinic today following up on recent labwork.  The patient has access to [employer] and has already seen the results from home. Discussed all labs including CBC, CMP, Lipid Panel, TSH, T4, Testosterone, and Semen Analysis. Discussed just watching elevated bilirubin and decreasing alcohol intake at this time with other liver enzymes being normal. Discussed semen analysis and the patient and his wife have concerns about his Semen motility being low. The patient and his wife are requesting information on treatments to increase motility as they are wanting to have another child and have been unsuccessful for the past 2.5 years. Discussed need for further testing to identify the cause of his low motility in order to properly treat. They are ok with referral to further explore cause of infertility and explore treatment options.     Anxiety and Depression: Has been taking Prozac as prescribed for the past 3 days. He had some dizziness waking up the 2nd morning, but unsure if medication or lack of eating the previous night. No complaints of headaches, tremors, or increased irritability. Denies self harm or suicidal ideation.      Objective   Vital Signs:   /75 (BP Location: Right arm, Patient Position: Sitting, Cuff Size: Adult)   Pulse 88   Temp 98.2 °F (36.8 °C) (Temporal)   Resp 16   Ht 180.3 cm (71\") Comment: ME  Wt 59.8 kg (131 lb 12.8 oz)   SpO2 99%   BMI 18.38 kg/m²     Physical Exam  Vitals and nursing note reviewed.   Constitutional:       Appearance: Normal appearance. He is well-developed.   HENT:      Head: Normocephalic and atraumatic.      Nose: Nose normal.      Mouth/Throat:      Mouth: Mucous membranes are moist.      Pharynx: Oropharynx is clear.   Eyes:      Conjunctiva/sclera: Conjunctivae " normal.   Cardiovascular:      Rate and Rhythm: Normal rate and regular rhythm.      Pulses: Normal pulses.      Heart sounds: Normal heart sounds.   Pulmonary:      Effort: Pulmonary effort is normal.      Breath sounds: Normal breath sounds.   Abdominal:      General: Abdomen is flat. Bowel sounds are normal.   Musculoskeletal:         General: Normal range of motion.      Cervical back: Normal range of motion.   Skin:     General: Skin is warm and dry.   Neurological:      General: No focal deficit present.      Mental Status: He is alert and oriented to person, place, and time.   Psychiatric:         Mood and Affect: Mood normal.         Behavior: Behavior normal.        Result Review :                 Assessment and Plan    Diagnoses and all orders for this visit:    1. 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 Abnormal semen analysis  -     Ambulatory Referral to Urology    3. Anxiety and depression      Plan:  - Begin taking multivitamin  - refrain from any excessive alcohol use (denies overuse currently)  - Referral to infertility specialist  - Continue Prozac and keep follow up             Follow Up   Return for Next scheduled follow up.  Patient was given instructions and counseling regarding his condition or for health maintenance advice. Please see specific information pulled into the AVS if appropriate.

## 2022-05-06 ENCOUNTER — PATIENT ROUNDING (BHMG ONLY) (OUTPATIENT)
Dept: FAMILY MEDICINE CLINIC | Facility: CLINIC | Age: 27
End: 2022-05-06

## 2022-05-06 NOTE — PROGRESS NOTES
May 6, 2022    Hello, may I speak with [place] Luigi Phelan?    My name is Sulma      I am  with [facility]  [facility]  [address] [address] [phone].    Before we get started may I verify your date of birth? 1995    I am calling to officially welcome you to our practice and ask about your recent visit. Is this a good time to talk? yes    Tell me about your visit with us. What things went well?  went really great     We're always looking for ways to make our patients' experiences even better. Do you have recommendations on ways we may improve?  yes- wait time in exam room to see provider     Overall were you satisfied with your first visit to our practice? yes       I appreciate you taking the time to speak with me today. Is there anything else I can do for you? Yes- checking on status of referral- advised pt that referral has been placed and that [facility] will [facility], pt provided with [facility]- pt verbalized understanding.       Thank you, and have a great day.

## 2022-05-10 ENCOUNTER — TELEPHONE (OUTPATIENT)
Dept: FAMILY MEDICINE CLINIC | Facility: CLINIC | Age: 27
End: 2022-05-10

## 2022-05-10 DIAGNOSIS — N46.9 INFERTILITY MALE: Primary | ICD-10-CM

## 2022-05-10 NOTE — TELEPHONE ENCOUNTER
Caller: Jenni Phelan    Relationship to patient: Emergency Contact    Best call back number: [phone]    Patient is needing an order for a semen analysis for his urologist. The first one came back abnormal. A second one is needed for a confirmation. Please advise.

## 2022-05-13 NOTE — TELEPHONE ENCOUNTER
Called pt's wife back to inquire. Reports that specialist is requiring a 2nd sample 2 weeks from apt date for consult, 2 weeks will be on Tuesday- apt on  5/31/2022. Ordered pended. Referral doesn't have any appt info listed in it. Pt wants to complete lab on Tuesday 5/17/22.

## 2022-06-13 ENCOUNTER — OFFICE VISIT (OUTPATIENT)
Dept: FAMILY MEDICINE CLINIC | Facility: CLINIC | Age: 27
End: 2022-06-13

## 2022-06-13 VITALS
DIASTOLIC BLOOD PRESSURE: 94 MMHG | OXYGEN SATURATION: 99 % | HEIGHT: 71 IN | RESPIRATION RATE: 18 BRPM | SYSTOLIC BLOOD PRESSURE: 138 MMHG | HEART RATE: 70 BPM | TEMPERATURE: 98.2 F | BODY MASS INDEX: 17.92 KG/M2 | WEIGHT: 128 LBS

## 2022-06-13 DIAGNOSIS — F32.A ANXIETY AND DEPRESSION: Primary | ICD-10-CM

## 2022-06-13 DIAGNOSIS — B07.9 VIRAL WARTS, UNSPECIFIED TYPE: ICD-10-CM

## 2022-06-13 DIAGNOSIS — F41.9 ANXIETY AND DEPRESSION: Primary | ICD-10-CM

## 2022-06-13 PROCEDURE — 99213 OFFICE O/P EST LOW 20 MIN: CPT | Performed by: FAMILY MEDICINE

## 2022-06-13 RX ORDER — BUSPIRONE HYDROCHLORIDE 5 MG/1
5 TABLET ORAL 3 TIMES DAILY PRN
Qty: 90 TABLET | Refills: 0 | Status: SHIPPED | OUTPATIENT
Start: 2022-06-13 | End: 2022-07-29

## 2022-06-13 NOTE — PROGRESS NOTES
"Subjective cc: anxiety/depression   [place] Luigi Phelan is a 26 y.o. male.     History of Present Illness     The patient presents today for a follow-up. He is accompanied by an adult female who contributes to history.    The patient reports Prozac is working well as anxiety does not seem to be an issue for him at this time. He states that he had worse anxiety and anger issues in the past. The patient mentions that he used to be so \"wired\" that he could not stand and sit in his house. He states that he has noticed a big difference on Prozac. He reports that he did have a massive panic attack a couple of weeks ago, which lasted about an hour. He adds that he has been more patient, and his anxiety leveled out since he has been on the Prozac. He mentions he has not tried any other medications in the past. He prefers to be on the current dose of Prozac. He states he takes it in the morning and at night. The patient reports he has a headache every day when he wakes up on Prozac. He additionally reports being fatigued on Prozac. He describes the headache as a sinus headache and locates an area where he feels the pain. The patient states he is fine for a couple of hours after taking Tylenol in the morning and notes that he takes it again during the day as needed. The woman states the patient has a history of migraine, although he has not had it recently. She adds they are not sure if his headaches are side effects of Prozac or if his migraine headache has recurred. He would like to take Buspar as needed.    The patient reports having warts on his hand, which he has more frequently. He adds his warts get caught more often. He had 2 of his warts sprayed last time and notes that one of them went away, but the other one did not.    The following portions of the patient's history were reviewed and updated as appropriate: allergies, current medications, past family history, past medical history, past social history, past surgical " "history and problem list.        Review of Systems    Objective   Blood pressure 138/94, pulse 70, temperature 98.2 °F (36.8 °C), temperature source Infrared, resp. rate 18, height 180.3 cm (71\"), weight 58.1 kg (128 lb), SpO2 99 %.  Physical Exam  Vitals and nursing note reviewed.   Constitutional:       General: He is not in acute distress.     Appearance: Normal appearance. He is not toxic-appearing.   HENT:      Head: Normocephalic and atraumatic.      Right Ear: External ear normal.      Left Ear: External ear normal.   Cardiovascular:      Rate and Rhythm: Normal rate.   Skin:         Neurological:      Mental Status: He is alert and oriented to person, place, and time.   Psychiatric:         Mood and Affect: Mood normal.         Behavior: Behavior normal.         Thought Content: Thought content normal.         Judgment: Judgment normal.     Cryotherapy, Skin Lesion    Date/Time: 6/13/2022 11:50 PM  Performed by: Eliane Victoria MD  Authorized by: Eliane Victoria MD   Consent: Verbal consent obtained. Written consent obtained.  Risks and benefits: risks, benefits and alternatives were discussed  Consent given by: patient  Patient understanding: patient states understanding of the procedure being performed  Patient consent: the patient's understanding of the procedure matches consent given  Procedure consent: procedure consent matches procedure scheduled  Relevant documents: relevant documents present and verified  Patient identity confirmed: verbally with patient  Local anesthesia used: no    Anesthesia:  Local anesthesia used: no    Sedation:  Patient sedated: no    Patient tolerance: patient tolerated the procedure well with no immediate complications  Comments: Liquid nitrogen application x 3 with complete freeze and thaw allowed between each application             Assessment & Plan   Problems Addressed this Visit        Mental Health    Anxiety and depression - Primary    Relevant Medications "    busPIRone (BUSPAR) 5 MG tablet      Other Visit Diagnoses     Viral warts, unspecified type          Diagnoses       Codes Comments    Anxiety and depression    -  Primary ICD-10-CM: F41.9, F32.A  ICD-9-CM: 300.00, 311     Viral warts, unspecified type     ICD-10-CM: B07.9  ICD-9-CM: 078.10           1. Anxiety and depression: Chronic, improving, but not there yet.  - We will continue on current dose of Prozac.  - We will add Buspar as needed.  - Recheck in 2 months to see if improving.  - Contact the office with any symptoms if not improving.  - Procedure note as above.    2. Wart:  - Procedure note as above.        Transcribed from ambient dictation for Eliane Victoria MD by JAI ADAMSON.  06/13/22   12:02 CDT    Patient verbalized consent to the visit recording.          This document has been electronically signed by Eliane Victoria MD on June 13, 2022 23:51 CDT

## 2022-07-01 ENCOUNTER — OFFICE VISIT (OUTPATIENT)
Dept: NEUROLOGY | Facility: CLINIC | Age: 27
End: 2022-07-01

## 2022-07-01 VITALS
DIASTOLIC BLOOD PRESSURE: 82 MMHG | SYSTOLIC BLOOD PRESSURE: 120 MMHG | OXYGEN SATURATION: 98 % | BODY MASS INDEX: 17.5 KG/M2 | WEIGHT: 125 LBS | HEIGHT: 71 IN | HEART RATE: 79 BPM

## 2022-07-01 DIAGNOSIS — G47.34 NOCTURNAL HYPOXIA: ICD-10-CM

## 2022-07-01 DIAGNOSIS — Z86.69 HISTORY OF SEIZURES AS A CHILD: ICD-10-CM

## 2022-07-01 DIAGNOSIS — G47.33 OBSTRUCTIVE SLEEP APNEA: Primary | ICD-10-CM

## 2022-07-01 PROCEDURE — 99203 OFFICE O/P NEW LOW 30 MIN: CPT | Performed by: PHYSICIAN ASSISTANT

## 2022-07-01 RX ORDER — DIPHENOXYLATE HYDROCHLORIDE AND ATROPINE SULFATE 2.5; .025 MG/1; MG/1
1 TABLET ORAL DAILY
COMMUNITY

## 2022-07-01 NOTE — PROGRESS NOTES
Subjective   Trent Luigi Phelan is a 26 y.o. male who presents as a new patient being seen for sleep disturbance and potential sleep apnea symptoms.    History of Present Illness       Possible obstructive sleep apnea  The patient has noticed his oxygen saturation dropping at night based on his smart watch. His wife has also told him that he has been having sleep apnea at night. He reports waking up with a dry throat and mouth and feels an excessive need to sleep during the day. The patient remembers having a tonsillectomy and adenoidectomy during his childhood and admits having a complication to the anesthesia that was used during the procedure. He remembers being combative which also happened when he had an emergency appendectomy during his teenage years. He reports having a family history of sleep apnea. Both of his uncles and his maternal grandfather are using a CPAP machine. On the other hand, his paternal grandfather was diagnosed with sleep apnea right before he passed away.     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 His neurologist back then told him that he will grow out of it. He denies having any episodes of the absence seizures and migraines for years.     The following portions of the patient's history were reviewed and updated as appropriate: allergies, current medications, past family history, past medical history, past social history, past surgical history and problem list.    Review of Systems:  A review of systems was performed, and positive findings are noted in the HPI.      Current Outpatient Medications:   •  Ascorbic Acid (VITAMIN C PO), Take  by mouth Daily., Disp: , Rfl:   •  busPIRone (BUSPAR) 5 MG tablet, Take 1 tablet by mouth 3 (Three) Times a Day As Needed (anxiety). (Patient taking differently: Take 5 mg by mouth Daily.), Disp: 90 tablet, Rfl: 0  •  Cholecalciferol  (VITAMIN D3 PO), Take  by mouth Daily., Disp: , Rfl:   •  Ferrous Sulfate (IRON PO), Take  by mouth Daily., Disp: , Rfl:   •  FLUoxetine (PROzac) 20 MG capsule, Take 1 capsule by mouth Daily., Disp: 90 capsule, Rfl: 0  •  Loratadine (CLARITIN PO), Take  by mouth As Needed., Disp: , Rfl:   •  Multiple Vitamins-Minerals (ZINC PO), Take  by mouth Daily., Disp: , Rfl:   •  multivitamin (MULTI-VITAMIN PO), Take 1 tablet by mouth Daily., Disp: , Rfl:      Objective   Physical Exam  Vitals and nursing note reviewed.   Constitutional:       General: He is not in acute distress.     Appearance: Normal appearance. He is well-developed. He is not ill-appearing, toxic-appearing or diaphoretic.   HENT:      Head: Normocephalic and atraumatic.      Right Ear: External ear normal.      Left Ear: External ear normal.   Eyes:      Conjunctiva/sclera: Conjunctivae normal.      Pupils: Pupils are equal, round, and reactive to light.   Neck:      Thyroid: No thyromegaly.      Vascular: No carotid bruit.   Cardiovascular:      Rate and Rhythm: Normal rate and regular rhythm.      Pulses: Normal pulses.      Heart sounds: Normal heart sounds. No murmur heard.  Pulmonary:      Effort: Pulmonary effort is normal. No respiratory distress.      Breath sounds: Normal breath sounds.   Abdominal:      General: Bowel sounds are normal.      Palpations: Abdomen is soft. There is no mass.      Tenderness: There is no abdominal tenderness.   Musculoskeletal:         General: No swelling. Normal range of motion.      Cervical back: Normal range of motion and neck supple.   Lymphadenopathy:      Cervical: No cervical adenopathy.   Skin:     General: Skin is warm and dry.      Findings: No lesion or rash.   Neurological:      Mental Status: He is alert and oriented to person, place, and time.      Cranial Nerves: No cranial nerve deficit.      Sensory: No sensory deficit.      Motor: No weakness.      Coordination: Coordination normal.      Gait: Gait  normal.      Deep Tendon Reflexes: Reflexes are normal and symmetric.   Psychiatric:         Mood and Affect: Mood normal.         Behavior: Behavior normal.         Thought Content: Thought content normal.         Judgment: Judgment normal.       No results were reviewed or obtained today.      Assessment & Plan   Diagnoses and all orders for this visit:    1. Obstructive sleep apnea (Primary)  -     Polysomnography 4 or More Parameters; Future    2. Nocturnal hypoxia  -     EEG; Future  -     Polysomnography 4 or More Parameters; Future    3. History of seizures as a child  -     EEG; Future    1. Obstructive sleep apnea and nocturnal hypoxia  - This patient has a very strong history of multiple members of his family with sleep apnea and he feels that he has multiple symptoms. His [place] sleepiness scale does indeed show quite a bit of daytime sleepiness and he has had a home personal monitoring device indicate that he has pulse ox down into the 70s at night, although this is not a medical grade device. He also has concurrent symptoms that would not be inconsistent with sleep apnea. Of note, when discussing with the patient that other etiologies are possible and particularly given his body habitus being very slim and his airway not representing a very tight configuration with a class 1, I have concerns for other etiologies that might lead to abnormalities and lack of restorative sleep amongst those would be seizures.    2. 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 I do, of course, have concerns for continuing seizure and have discussed this with him. He does not admit to any overt seizure activity of a tonic clonic nature, but is very willing to get an EEG. Therefore, I feel that we will start  with an EEG and polysomnogram. No changes were made in his medication recommendations today and we will have him return in follow-up after testing.                 Transcribed from ambient dictation for TY Bean by Annette Chaudhry.  07/01/22   11:06 CDT    Patient verbalized consent to the visit recording.

## 2022-07-07 ENCOUNTER — LAB (OUTPATIENT)
Dept: LAB | Facility: HOSPITAL | Age: 27
End: 2022-07-07

## 2022-07-07 DIAGNOSIS — N46.9 INFERTILITY MALE: ICD-10-CM

## 2022-07-07 LAB
CHARACTER SMN: ABNORMAL
COLLECTION METHOD SMN: ABNORMAL
EPI CELLS #/AREA SMN HPF: ABNORMAL /HPF
LIQUEFACTION TIME SMN: NORMAL MIN
NON PROGRESSIVE MOTILITY: 40.8 %
RBC #/AREA SMN HPF: ABNORMAL /HPF
SEX ABSTIN DURATION TIME PATIENT: 3 DAYS
SLOW PROGRESSIVE MOTILITY: 0 %
SMI: 3 (ref 80–400)
SPEC CONTAINER SPEC: ABNORMAL
SPECIMEN VOL SMN: 7 ML
SPERM # SMN: 4.9 MILLIONS/ML
SPERM IMMOTILE NFR SMN: 55.1 %
SPERM MOTILE NFR SMN: 44.9 % MOTILE (ref 50–100)
SPERM PROG NFR SMN: 4.1 % (ref 25–100)
SPERM SMN: 2.7 % NORMAL (ref 15–100)
VISC SMN: NORMAL CP
WBC SMN QL: ABNORMAL /HPF
WHO STANDARD: ABNORMAL

## 2022-07-07 PROCEDURE — 89320 SEMEN ANAL VOL/COUNT/MOT: CPT

## 2022-07-18 ENCOUNTER — HOSPITAL ENCOUNTER (OUTPATIENT)
Dept: NEUROLOGY | Facility: HOSPITAL | Age: 27
Discharge: HOME | End: 2022-07-18
Admitting: PHYSICIAN ASSISTANT

## 2022-07-18 DIAGNOSIS — Z86.69 HISTORY OF SEIZURES AS A CHILD: ICD-10-CM

## 2022-07-18 DIAGNOSIS — G47.34 NOCTURNAL HYPOXIA: ICD-10-CM

## 2022-07-18 PROCEDURE — 95819 EEG AWAKE AND ASLEEP: CPT

## 2022-07-18 PROCEDURE — 95819 EEG AWAKE AND ASLEEP: CPT | Performed by: PSYCHIATRY & NEUROLOGY

## 2022-07-29 DIAGNOSIS — F41.9 ANXIETY AND DEPRESSION: ICD-10-CM

## 2022-07-29 DIAGNOSIS — F32.A ANXIETY AND DEPRESSION: ICD-10-CM

## 2022-07-29 RX ORDER — FLUOXETINE HYDROCHLORIDE 20 MG/1
CAPSULE ORAL
Qty: 90 CAPSULE | Refills: 0 | Status: SHIPPED | OUTPATIENT
Start: 2022-07-29 | End: 2022-10-28

## 2022-07-29 RX ORDER — BUSPIRONE HYDROCHLORIDE 5 MG/1
TABLET ORAL
Qty: 90 TABLET | Refills: 0 | Status: SHIPPED | OUTPATIENT
Start: 2022-07-29 | End: 2022-11-21 | Stop reason: SDUPTHER

## 2022-07-29 NOTE — TELEPHONE ENCOUNTER
Rx Refill Note  Requested Prescriptions     Pending Prescriptions Disp Refills   • busPIRone (BUSPAR) 5 MG tablet [Pharmacy Med Name: busPIRone HCL 5 MG TABLET] 90 tablet 0     Sig: TAKE ONE TABLET BY MOUTH THREE TIMES A DAY AS NEEDED FOR ANXIETY   • FLUoxetine (PROzac) 20 MG capsule [Pharmacy Med Name: FLUoxetine HCL 20 MG CAPSULE] 90 capsule 0     Sig: TAKE ONE CAPSULE BY MOUTH DAILY      Last office visit with prescribing clinician: 6/13/2022      Next office visit with prescribing clinician: Visit date not found         Lyly Blunt MA  07/29/22, 11:58 CDT

## 2022-08-03 ENCOUNTER — HOSPITAL ENCOUNTER (OUTPATIENT)
Dept: SLEEP MEDICINE | Facility: HOSPITAL | Age: 27
Discharge: HOME | End: 2022-08-03
Admitting: PHYSICIAN ASSISTANT

## 2022-08-03 VITALS
DIASTOLIC BLOOD PRESSURE: 90 MMHG | RESPIRATION RATE: 16 BRPM | BODY MASS INDEX: 17.5 KG/M2 | HEART RATE: 87 BPM | HEIGHT: 71 IN | WEIGHT: 125 LBS | SYSTOLIC BLOOD PRESSURE: 129 MMHG

## 2022-08-03 DIAGNOSIS — G47.34 NOCTURNAL HYPOXIA: ICD-10-CM

## 2022-08-03 DIAGNOSIS — G47.33 OBSTRUCTIVE SLEEP APNEA: ICD-10-CM

## 2022-08-03 PROCEDURE — 95810 POLYSOM 6/> YRS 4/> PARAM: CPT | Performed by: PSYCHIATRY & NEUROLOGY

## 2022-08-03 PROCEDURE — 95810 POLYSOM 6/> YRS 4/> PARAM: CPT

## 2022-08-15 ENCOUNTER — HOSPITAL ENCOUNTER (OUTPATIENT)
Dept: SLEEP MEDICINE | Facility: HOSPITAL | Age: 27
Discharge: HOME | End: 2022-08-15

## 2022-08-26 ENCOUNTER — TELEMEDICINE (OUTPATIENT)
Dept: FAMILY MEDICINE CLINIC | Facility: CLINIC | Age: 27
End: 2022-08-26

## 2022-08-26 DIAGNOSIS — R52 GENERALIZED BODY ACHES: ICD-10-CM

## 2022-08-26 DIAGNOSIS — R50.9 FEVER, UNSPECIFIED FEVER CAUSE: ICD-10-CM

## 2022-08-26 DIAGNOSIS — R11.2 NAUSEA AND VOMITING IN ADULT: Primary | ICD-10-CM

## 2022-08-26 PROCEDURE — 99213 OFFICE O/P EST LOW 20 MIN: CPT | Performed by: NURSE PRACTITIONER

## 2022-08-26 RX ORDER — ONDANSETRON 4 MG/1
4 TABLET, ORALLY DISINTEGRATING ORAL EVERY 8 HOURS PRN
Qty: 30 TABLET | Refills: 0 | Status: SHIPPED | OUTPATIENT
Start: 2022-08-26

## 2022-08-26 NOTE — PROGRESS NOTES
"Chief Complaint  Vomiting, Fever, and Generalized Body Aches    Subjective        [place] Luigi Phelan presents to [facility]  History of Present Illness  Consent signed electronically and viewed by provider.  Patient has a 24 hr history of n/v along with generalized body aches and low grade (100) fever.  He generally does not feel well.  He has no known exposure to similar illness.  He was vaccinated with Michelle Covid vaccine last month.  He has not performed a home test nor does he have one available.    Objective   Vital Signs:  There were no vitals taken for this visit.  Estimated body mass index is 17.43 kg/m² as calculated from the following:    Height as of 8/3/22: 180.3 cm (71\").    Weight as of 8/3/22: 56.7 kg (125 lb).      Physical Exam   Patient appears to feel unwell via video encounter.  He is in no acute distress.    Result Review :                Assessment and Plan   Diagnoses and all orders for this visit:    1. Nausea and vomiting in adult (Primary)  -     ondansetron ODT (Zofran ODT) 4 MG disintegrating tablet; Place 1 tablet on the tongue Every 8 (Eight) Hours As Needed for Nausea or Vomiting.  Dispense: 30 tablet; Refill: 0  -     QUESTIONNAIRE SERIES    2. Fever, unspecified fever cause  -     QUESTIONNAIRE SERIES    3. Generalized body aches  -     QUESTIONNAIRE SERIES    Patient will send a family member to obtain a Covid test and he will post results for provider to view.  Patient agrees to quarantine until these results are obtained.         Follow Up   Return if symptoms worsen or fail to improve.  Patient was given instructions and counseling regarding his condition or for health maintenance advice. Please see specific information pulled into the AVS if appropriate.     CULLEN Armendariz  This note is electronically signed.  "

## 2022-10-28 ENCOUNTER — OFFICE VISIT (OUTPATIENT)
Dept: FAMILY MEDICINE CLINIC | Facility: CLINIC | Age: 27
End: 2022-10-28

## 2022-10-28 VITALS
TEMPERATURE: 96.4 F | RESPIRATION RATE: 16 BRPM | BODY MASS INDEX: 19.04 KG/M2 | WEIGHT: 136 LBS | DIASTOLIC BLOOD PRESSURE: 79 MMHG | HEIGHT: 71 IN | OXYGEN SATURATION: 98 % | SYSTOLIC BLOOD PRESSURE: 135 MMHG | HEART RATE: 82 BPM

## 2022-10-28 DIAGNOSIS — G89.29 CHRONIC RIGHT SHOULDER PAIN: ICD-10-CM

## 2022-10-28 DIAGNOSIS — M54.6 CHRONIC BILATERAL THORACIC BACK PAIN: ICD-10-CM

## 2022-10-28 DIAGNOSIS — M54.41 CHRONIC BILATERAL LOW BACK PAIN WITH BILATERAL SCIATICA: Primary | ICD-10-CM

## 2022-10-28 DIAGNOSIS — F32.A ANXIETY AND DEPRESSION: ICD-10-CM

## 2022-10-28 DIAGNOSIS — F41.9 ANXIETY AND DEPRESSION: ICD-10-CM

## 2022-10-28 DIAGNOSIS — G89.29 CHRONIC BILATERAL LOW BACK PAIN WITH BILATERAL SCIATICA: Primary | ICD-10-CM

## 2022-10-28 DIAGNOSIS — M25.511 CHRONIC RIGHT SHOULDER PAIN: ICD-10-CM

## 2022-10-28 DIAGNOSIS — G89.29 CHRONIC BILATERAL THORACIC BACK PAIN: ICD-10-CM

## 2022-10-28 DIAGNOSIS — M54.42 CHRONIC BILATERAL LOW BACK PAIN WITH BILATERAL SCIATICA: Primary | ICD-10-CM

## 2022-10-28 PROCEDURE — 99214 OFFICE O/P EST MOD 30 MIN: CPT | Performed by: FAMILY MEDICINE

## 2022-10-28 RX ORDER — VENLAFAXINE HYDROCHLORIDE 37.5 MG/1
37.5 CAPSULE, EXTENDED RELEASE ORAL DAILY
Qty: 30 CAPSULE | Refills: 2 | Status: SHIPPED | OUTPATIENT
Start: 2022-10-28 | End: 2022-11-21

## 2022-10-28 RX ORDER — MELOXICAM 15 MG/1
15 TABLET ORAL DAILY
Qty: 30 TABLET | Refills: 2 | Status: SHIPPED | OUTPATIENT
Start: 2022-10-28

## 2022-11-21 ENCOUNTER — TELEMEDICINE (OUTPATIENT)
Dept: FAMILY MEDICINE CLINIC | Facility: CLINIC | Age: 27
End: 2022-11-21

## 2022-11-21 DIAGNOSIS — F41.9 ANXIETY AND DEPRESSION: Primary | ICD-10-CM

## 2022-11-21 DIAGNOSIS — F32.A ANXIETY AND DEPRESSION: Primary | ICD-10-CM

## 2022-11-21 PROCEDURE — 99213 OFFICE O/P EST LOW 20 MIN: CPT | Performed by: FAMILY MEDICINE

## 2022-11-21 RX ORDER — BUSPIRONE HYDROCHLORIDE 5 MG/1
5 TABLET ORAL 3 TIMES DAILY PRN
Qty: 90 TABLET | Refills: 2 | Status: SHIPPED | OUTPATIENT
Start: 2022-11-21

## 2022-11-21 NOTE — PROGRESS NOTES
Subjective cc: anxiety and depression  Trent Luigi Phelan is a 27 y.o. male who presents for mychart video visit.  He is present in the parking lot of my office and I am present in [place] location. He is agreeable to video visit.     Reports the medication caused him to have a seizure so he stopped the medication     He is interested in starting on something else   Still using buspar when needed - a little more lately since coming off his other medication     No toher questions     History of Present Illness     The following portions of the patient's history were reviewed and updated as appropriate: allergies, current medications, past family history, past medical history, past social history, past surgical history and problem list.        Review of Systems    Objective   There were no vitals taken for this visit.  Physical Exam  Vitals and nursing note reviewed.   Constitutional:       Appearance: Normal appearance.   Pulmonary:      Effort: Pulmonary effort is normal. No respiratory distress.      Breath sounds: Normal breath sounds.   Neurological:      Mental Status: He is alert and oriented to person, place, and time.   Psychiatric:         Mood and Affect: Mood normal.         Behavior: Behavior normal.         Thought Content: Thought content normal.         Judgment: Judgment normal.         Assessment & Plan   Problems Addressed this Visit        Mental Health    Anxiety and depression - Primary    Relevant Medications    sertraline (Zoloft) 50 MG tablet    busPIRone (BUSPAR) 5 MG tablet   Diagnoses       Codes Comments    Anxiety and depression    -  Primary ICD-10-CM: F41.9, F32.A  ICD-9-CM: 300.00, 311         PLAN:     #1 anxiety/depression: chronic,uncontrolled, DC current emdication, trial of zoloft, cotninue buspar PRN, refills given, recheck in 4-6 weeks, call sooner if any concerns     15 minutes           This document has been electronically signed by Eliane Victoria MD on November 21,  2022 14:25 CST

## 2023-01-05 ENCOUNTER — TELEMEDICINE (OUTPATIENT)
Dept: FAMILY MEDICINE CLINIC | Facility: CLINIC | Age: 28
End: 2023-01-05
Payer: COMMERCIAL

## 2023-01-05 DIAGNOSIS — F41.9 ANXIETY AND DEPRESSION: ICD-10-CM

## 2023-01-05 DIAGNOSIS — F32.A ANXIETY AND DEPRESSION: ICD-10-CM

## 2023-01-05 PROCEDURE — 99213 OFFICE O/P EST LOW 20 MIN: CPT | Performed by: FAMILY MEDICINE

## 2023-01-05 RX ORDER — SERTRALINE HYDROCHLORIDE 100 MG/1
100 TABLET, FILM COATED ORAL DAILY
Qty: 90 TABLET | Refills: 0 | Status: SHIPPED | OUTPATIENT
Start: 2023-01-05 | End: 2023-03-14 | Stop reason: SDUPTHER

## 2023-01-05 NOTE — PROGRESS NOTES
Subjective cc: anxiety and depression   [place] Luigi Phelan is a 27 y.o. male.     History of Present Illness   The patient presents today via telehealth for a follow-up on anxiety and depression. He reports that he is doing well.     He states the Zoloft 50 mg is working well for him, but he feels the dose needs to be increased. The patient reports he is taking the BuSpar daily, and it is working well for him. He notes he took 1 tablet of BuSpar on 01/04/2023, and it made him tired.    The following portions of the patient's history were reviewed and updated as appropriate: allergies, current medications, past family history, past medical history, past social history, past surgical history and problem list.        Review of Systems    Objective   There were no vitals taken for this visit.  Physical Exam  Constitutional:       Appearance: Normal appearance.   Neurological:      Mental Status: He is alert and oriented to person, place, and time.   Psychiatric:         Mood and Affect: Mood normal.         Behavior: Behavior normal.         Thought Content: Thought content normal.         Judgment: Judgment normal.         Assessment & Plan   Problems Addressed this Visit        Mental Health    Anxiety and depression    Relevant Medications    sertraline (Zoloft) 100 MG tablet   Diagnoses       Codes Comments    Anxiety and depression     ICD-10-CM: F41.9, F32.A  ICD-9-CM: 300.00, 311         1. Anxiety and depression: Chronic, improving but not controlled.  - We will increase dose of Zoloft from 50 mg to 100 mg daily.  - Continue on BuSpar.  - Reevaluation in 6 weeks.    15 minutes     Transcribed from ambient dictation for Eliane Victoria MD by Xin Robles.  01/05/23   09:42 CST    Patient or patient representative verbalized consent to the visit recording.  I have personally performed the services described in this document as transcribed by the above individual, and it is both accurate and complete.              This document has been electronically signed by Eliane Victoria MD on January 5, 2023 17:30 CST

## 2023-03-13 ENCOUNTER — TELEPHONE (OUTPATIENT)
Dept: FAMILY MEDICINE CLINIC | Facility: CLINIC | Age: 28
End: 2023-03-13
Payer: COMMERCIAL

## 2023-03-13 NOTE — TELEPHONE ENCOUNTER
Caller: Trent Phelan    Relationship: Self    Best call back number: [phone]    What medication are you requesting: ALLERGY MEDICATION    What are your current symptoms: CONGESTION, RUNNY NOSE    How long have you been experiencing symptoms: FOR A WHILE    Have you had these symptoms before:    [x] Yes  [] No    Have you been treated for these symptoms before:   [x] Yes  [] No    If a prescription is needed, what is your preferred pharmacy and phone number:    [facility] - [place], [place] -[facility] - [phone]  ; [phone] FAX    Additional notes:

## 2023-03-13 NOTE — TELEPHONE ENCOUNTER
Caller: [place] Phelan    Relationship: Self    Best call back number: [phone]    What is the best time to reach you:ANYTIME    Who are you requesting to speak with (clinical staff, provider,  specific staff member): DR ODOM      What was the call regarding: ENID WOULD LIKE YOU TO INCREASE HIS DEPRESSION MEDICATION (NOT BUSPAR).    Do you require a callback: YES

## 2023-03-13 NOTE — TELEPHONE ENCOUNTER
Called pt back to notify that he needs apt- pt scheduled for video visit tomorrow to discuss meds.

## 2023-03-14 ENCOUNTER — TELEMEDICINE (OUTPATIENT)
Dept: FAMILY MEDICINE CLINIC | Facility: CLINIC | Age: 28
End: 2023-03-14
Payer: COMMERCIAL

## 2023-03-14 DIAGNOSIS — F32.A ANXIETY AND DEPRESSION: ICD-10-CM

## 2023-03-14 DIAGNOSIS — J30.2 SEASONAL ALLERGIES: Primary | ICD-10-CM

## 2023-03-14 DIAGNOSIS — F41.9 ANXIETY AND DEPRESSION: ICD-10-CM

## 2023-03-14 PROCEDURE — 1159F MED LIST DOCD IN RCRD: CPT | Performed by: FAMILY MEDICINE

## 2023-03-14 PROCEDURE — 1160F RVW MEDS BY RX/DR IN RCRD: CPT | Performed by: FAMILY MEDICINE

## 2023-03-14 PROCEDURE — 99213 OFFICE O/P EST LOW 20 MIN: CPT | Performed by: FAMILY MEDICINE

## 2023-03-14 RX ORDER — SERTRALINE HYDROCHLORIDE 100 MG/1
150 TABLET, FILM COATED ORAL DAILY
Qty: 135 TABLET | Refills: 0 | Status: SHIPPED | OUTPATIENT
Start: 2023-03-14

## 2023-03-14 RX ORDER — LORATADINE 10 MG/1
10 TABLET ORAL DAILY
Qty: 90 TABLET | Refills: 0 | Status: SHIPPED | OUTPATIENT
Start: 2023-03-14

## 2023-03-14 NOTE — PROGRESS NOTES
Subjective cc: anxiety and depression   [place] Luigi Phelan is a 27 y.o. male who presents via mychart video visit for follow up on anxiety and depression.    He is at home   I am in office     He does feel like the zoloft is helping but needs to be a little stronger      He would like a medication sent in for seasonal allergies so he doesn't have to buy OTC because he is taking it more frequently.     History of Present Illness     The following portions of the patient's history were reviewed and updated as appropriate: allergies, current medications, past family history, past medical history, past social history, past surgical history and problem list.        Review of Systems    Objective   There were no vitals taken for this visit.  Physical Exam  Constitutional:       General: He is not in acute distress.     Appearance: Normal appearance. He is not toxic-appearing.   Pulmonary:      Effort: Pulmonary effort is normal. No respiratory distress.   Neurological:      Mental Status: He is alert and oriented to person, place, and time. Mental status is at baseline.   Psychiatric:         Mood and Affect: Mood normal.         Behavior: Behavior normal.         Thought Content: Thought content normal.         Judgment: Judgment normal.         Assessment & Plan   Problems Addressed this Visit        Mental Health    Anxiety and depression    Relevant Medications    sertraline (Zoloft) 100 MG tablet   Other Visit Diagnoses     Seasonal allergies    -  Primary    Relevant Medications    loratadine (Claritin) 10 MG tablet      Diagnoses       Codes Comments    Seasonal allergies    -  Primary ICD-10-CM: J30.2  ICD-9-CM: 477.9     Anxiety and depression     ICD-10-CM: F41.9, F32.A  ICD-9-CM: 300.00, 311       PLAN:     #1 anxiety/depression: chronic, improving, still could be better, will increase dose of medication to 150mg daily, continue buspar PRN, recheck in 3 months or sooner if needed     #2 seasonal allergies:  chronic, would like rx - claritin sent       15 minutes           This document has been electronically signed by Eliane Victoria MD on March 14, 2023 13:45 CDT

## 2023-05-10 ENCOUNTER — TELEPHONE (OUTPATIENT)
Dept: FAMILY MEDICINE CLINIC | Facility: CLINIC | Age: 28
End: 2023-05-10
Payer: COMMERCIAL

## 2023-05-10 NOTE — TELEPHONE ENCOUNTER
Called Pts wife regarding her referral and she stated her  needed an appt for more labs. Pt is scheduled for Friday at 1:00

## 2023-05-12 ENCOUNTER — TELEMEDICINE (OUTPATIENT)
Dept: FAMILY MEDICINE CLINIC | Facility: CLINIC | Age: 28
End: 2023-05-12
Payer: COMMERCIAL

## 2023-05-12 DIAGNOSIS — N46.9 MALE INFERTILITY: Primary | ICD-10-CM

## 2023-05-12 PROCEDURE — 99213 OFFICE O/P EST LOW 20 MIN: CPT | Performed by: FAMILY MEDICINE

## 2023-05-12 NOTE — PROGRESS NOTES
Subjective cc: labs   Trent Luigi Phelan is a 27 y.o. male.     History of Present Illness     The patient is interested in undergoing a semen analysis and a hormone panel. He notes he was seen by a fertility specialist and was placed on a fertility medication. The patient reports he was told it would be cheaper to order the semen analysis.      The following portions of the patient's history were reviewed and updated as appropriate: allergies, current medications, past family history, past medical history, past social history, past surgical history and problem list.        Review of Systems    Objective   There were no vitals taken for this visit.  Physical Exam  Vitals and nursing note reviewed.   Constitutional:       Appearance: Normal appearance. He is well-developed.   Pulmonary:      Effort: Pulmonary effort is normal.      Breath sounds: Normal air entry.   Neurological:      Mental Status: He is alert and oriented to person, place, and time.      Motor: Motor function is intact.   Psychiatric:         Mood and Affect: Mood normal.         Behavior: Behavior normal.         Assessment & Plan   Problems Addressed this Visit    None  Visit Diagnoses       Male infertility    -  Primary    Relevant Orders    Semen Analysis - Semen, Voided          Diagnoses         Codes Comments    Male infertility    -  Primary ICD-10-CM: N46.9  ICD-9-CM: 606.9           1. Male infertility, new, needs further evaluation.  - Patient has been seeing a fertility specialist with his wife as they are interested in pregnancy.  - Will order for a semen analysis to be collected down at the main hospital lab.  - Will place orders for home hormone testing once patient lets me know which orders are being requested and he can stop by the office and have those completed at his convenience.  15 minutes     Transcribed from ambient dictation for Eliane Victoria MD by Nay Boyer.  05/12/23   14:05 CDT    Patient or patient  representative verbalized consent to the visit recording.  I have personally performed the services described in this document as transcribed by the above individual, and it is both accurate and complete.          This document has been electronically signed by Eliane Victoria MD on May 28, 2023 05:35 CDT

## 2023-05-19 ENCOUNTER — TELEPHONE (OUTPATIENT)
Dept: FAMILY MEDICINE CLINIC | Facility: CLINIC | Age: 28
End: 2023-05-19
Payer: COMMERCIAL

## 2023-05-19 NOTE — TELEPHONE ENCOUNTER
Called about labs. Lady who answered said she just got this phone. Please check with patient to see if this is still a good number

## 2023-06-09 ENCOUNTER — OFFICE VISIT (OUTPATIENT)
Dept: FAMILY MEDICINE CLINIC | Facility: CLINIC | Age: 28
End: 2023-06-09
Payer: COMMERCIAL

## 2023-06-09 VITALS
OXYGEN SATURATION: 99 % | SYSTOLIC BLOOD PRESSURE: 120 MMHG | BODY MASS INDEX: 20.83 KG/M2 | DIASTOLIC BLOOD PRESSURE: 78 MMHG | HEART RATE: 76 BPM | HEIGHT: 71 IN | RESPIRATION RATE: 16 BRPM | TEMPERATURE: 97.8 F | WEIGHT: 148.8 LBS

## 2023-06-09 DIAGNOSIS — M25.562 ACUTE PAIN OF BOTH KNEES: Primary | ICD-10-CM

## 2023-06-09 DIAGNOSIS — F32.A ANXIETY AND DEPRESSION: ICD-10-CM

## 2023-06-09 DIAGNOSIS — F41.9 ANXIETY AND DEPRESSION: ICD-10-CM

## 2023-06-09 DIAGNOSIS — M25.561 ACUTE PAIN OF BOTH KNEES: Primary | ICD-10-CM

## 2023-06-09 PROCEDURE — 99213 OFFICE O/P EST LOW 20 MIN: CPT | Performed by: FAMILY MEDICINE

## 2023-06-09 RX ORDER — METHYLPREDNISOLONE 4 MG/1
TABLET ORAL
Qty: 21 TABLET | Refills: 0 | Status: SHIPPED | OUTPATIENT
Start: 2023-06-09

## 2023-06-09 RX ORDER — MELOXICAM 15 MG/1
15 TABLET ORAL DAILY
Qty: 30 TABLET | Refills: 0 | Status: SHIPPED | OUTPATIENT
Start: 2023-06-09

## 2023-06-09 RX ORDER — BUSPIRONE HYDROCHLORIDE 5 MG/1
5 TABLET ORAL 3 TIMES DAILY PRN
Qty: 90 TABLET | Refills: 2 | Status: SHIPPED | OUTPATIENT
Start: 2023-06-09

## 2023-06-09 NOTE — PROGRESS NOTES
Subjective cc: knee pain   Trent Luigi Phelan is a 27 y.o. male.     History of Present Illness      The patient presents today for bilateral knee pain. He is accompanied by an adult female.    The patient reports his bilateral knee pain started approximately 1 month ago. He does not remember how it started, but it started when he ran. He states it has progressively gotten worse. His right knee has been giving out, so he has been wearing an old brace. The pain has radiated up into his thighs and into his hips, and all the way down into his ankles. The patient denies any other symptoms other than the knee pain. He states he wears a brace on his right knee more often. His right knee has been discolored. They swell from time to time, left more than right. He denies any known injury to his knees. The patient reports walking is not as bad. He plays softball, and it hurts. The patient cannot run to base without pain. Both of his ankles are painful now. The patient states he tore a ligament in one of his ankles while in middle school, but cannot recall which one. He denies any fever or chills. The patient has not been taking meloxicam. He asked if he could get a knee brace ordered similar to what he is wearing today.     The patient reports he had to stop taking his antidepressants because they sent him into seizures. He states he weaned himself off slowly. The patient is still taking his BuSpar as needed, but he is not using the antidepressant currently. He was on Prozac, and that is when the seizures started. 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 The patient reports he went to [place] a couple of months ago and he had back-to-back seizures. Any ride that had a flash of light triggered them. He states lately it has been multiple times per day. The patient reports he has not seen neurology since he was a child and declines referral at this time.    A review of systems was performed, and  "pertinent findings are noted in the HPI.      The following portions of the patient's history were reviewed and updated as appropriate: allergies, current medications, past family history, past medical history, past social history, past surgical history, and problem list.        Review of Systems    Objective   Blood pressure 120/78, pulse 76, temperature 97.8 °F (36.6 °C), temperature source Infrared, resp. rate 16, height 180.3 cm (71\"), weight 67.5 kg (148 lb 12.8 oz), SpO2 99 %.  Physical Exam  Vitals and nursing note reviewed.   Constitutional:       General: He is not in acute distress.     Appearance: Normal appearance. He is not toxic-appearing.   HENT:      Head: Normocephalic and atraumatic.      Right Ear: External ear normal.      Left Ear: External ear normal.      Nose: Nose normal.   Eyes:      General:         Right eye: No discharge.         Left eye: No discharge.      Conjunctiva/sclera: Conjunctivae normal.   Cardiovascular:      Rate and Rhythm: Normal rate.      Pulses: Normal pulses.   Pulmonary:      Effort: Pulmonary effort is normal. No respiratory distress.      Breath sounds: Normal breath sounds.   Musculoskeletal:         General: Swelling and tenderness present.      Right knee: Swelling present. No crepitus. Decreased range of motion. Tenderness present.      Left knee: Swelling present. No crepitus. Decreased range of motion. Tenderness present.   Skin:     General: Skin is warm.   Neurological:      Mental Status: He is alert and oriented to person, place, and time.      Gait: Gait abnormal.   Psychiatric:         Mood and Affect: Mood normal.         Behavior: Behavior normal.         Thought Content: Thought content normal.         Judgment: Judgment normal.       Assessment & Plan   Problems Addressed this Visit          Mental Health    Anxiety and depression    Relevant Medications    busPIRone (BUSPAR) 5 MG tablet     Other Visit Diagnoses       Acute pain of both knees    -  " Primary    Relevant Medications    methylPREDNISolone (MEDROL) 4 MG dose pack    meloxicam (Mobic) 15 MG tablet    Other Relevant Orders    XR Knee 1 or 2 View Bilateral (Completed)    Miscellaneous DME          Diagnoses         Codes Comments    Acute pain of both knees    -  Primary ICD-10-CM: M25.561, M25.562  ICD-9-CM: 338.19, 719.46     Anxiety and depression     ICD-10-CM: F41.9, F32.A  ICD-9-CM: 300.00, 311           1. Bilateral knee pain, new, needs further evaluation.  Patient does have some visible swelling in his knees bilaterally.   We will get an x-ray of both knees.   We will order a steroid pack and an anti-inflammatory.   Advised patient to take these with food.   Return to office if symptoms are not improving, and we will consult orthopedics or order MRI for further evaluation.    2. Anxiety and depression, chronic, controlled with BuSpar.  We will continue on that medication for now.   Patient to return to office if symptoms are not improving.         Transcribed from ambient dictation for Eliane Victoria MD by Carleen William.  06/09/23   15:38 CDT    Patient or patient representative verbalized consent to the visit recording.  I have personally performed the services described in this document as transcribed by the above individual, and it is both accurate and complete.        This document has been electronically signed by Eliane Victoria MD on June 9, 2023 16:43 CDT

## 2023-06-16 ENCOUNTER — TELEPHONE (OUTPATIENT)
Dept: FAMILY MEDICINE CLINIC | Facility: CLINIC | Age: 28
End: 2023-06-16
Payer: COMMERCIAL

## 2023-06-16 DIAGNOSIS — M25.561 CHRONIC PAIN OF BOTH KNEES: Primary | ICD-10-CM

## 2023-06-16 DIAGNOSIS — M25.562 CHRONIC PAIN OF BOTH KNEES: Primary | ICD-10-CM

## 2023-06-16 DIAGNOSIS — G89.29 CHRONIC PAIN OF BOTH KNEES: Primary | ICD-10-CM

## 2023-06-16 NOTE — TELEPHONE ENCOUNTER
New order sent- pt needed a new brace to replace the existing one he already had that broke today. DME faxed to [facility].

## 2023-06-16 NOTE — TELEPHONE ENCOUNTER
Caller: Trent Phelan Luigi    Relationship: Self    Best call back number: [phone]    What orders are you requesting    RIGHT KNEE BRACE - PATIENT SAYS IT BROKE ON HIM TODAY AT WORK.    Additional notes:     PLEASE USE [facility]

## 2023-11-10 ENCOUNTER — OFFICE VISIT (OUTPATIENT)
Dept: FAMILY MEDICINE CLINIC | Facility: CLINIC | Age: 28
End: 2023-11-10
Payer: COMMERCIAL

## 2023-11-10 VITALS
OXYGEN SATURATION: 97 % | HEART RATE: 106 BPM | SYSTOLIC BLOOD PRESSURE: 130 MMHG | RESPIRATION RATE: 20 BRPM | BODY MASS INDEX: 21 KG/M2 | TEMPERATURE: 97.5 F | HEIGHT: 71 IN | DIASTOLIC BLOOD PRESSURE: 85 MMHG | WEIGHT: 150 LBS

## 2023-11-10 DIAGNOSIS — J40 BRONCHITIS: Primary | ICD-10-CM

## 2023-11-10 LAB
EXPIRATION DATE: NORMAL
FLUAV AG UPPER RESP QL IA.RAPID: NOT DETECTED
FLUBV AG UPPER RESP QL IA.RAPID: NOT DETECTED
INTERNAL CONTROL: NORMAL
Lab: NORMAL
SARS-COV-2 AG UPPER RESP QL IA.RAPID: NOT DETECTED

## 2023-11-10 PROCEDURE — 87428 SARSCOV & INF VIR A&B AG IA: CPT | Performed by: FAMILY MEDICINE

## 2023-11-10 PROCEDURE — 99213 OFFICE O/P EST LOW 20 MIN: CPT | Performed by: FAMILY MEDICINE

## 2023-11-10 RX ORDER — METHYLPREDNISOLONE 4 MG/1
TABLET ORAL
Qty: 21 TABLET | Refills: 0 | Status: SHIPPED | OUTPATIENT
Start: 2023-11-10

## 2023-11-10 RX ORDER — AZITHROMYCIN 250 MG/1
TABLET, FILM COATED ORAL
Qty: 6 TABLET | Refills: 0 | Status: SHIPPED | OUTPATIENT
Start: 2023-11-10

## 2023-11-10 RX ORDER — ALBUTEROL SULFATE 90 UG/1
2 AEROSOL, METERED RESPIRATORY (INHALATION) EVERY 4 HOURS PRN
Qty: 8 G | Refills: 0 | Status: SHIPPED | OUTPATIENT
Start: 2023-11-10

## 2023-11-10 NOTE — PROGRESS NOTES
"Chief Complaint  Cough (Mr. Phelan is here for cough. )    Subjective        Cibola Luigi Phelan presents to [facility]  History of Present Illness  Mr. Meyers presents today for a sick visit.  He started feeling bad a week ago.  He and his wife recently went to [place].  They started feeling bad when they got back.      He has had a sore throat, coughing, mucus.  He has chest burning and tightness when he coughs.  He has had subjective fever off and on.  He has had nausea and vomiting.          Objective   Vital Signs:  /85 (BP Location: Left arm, Patient Position: Sitting, Cuff Size: Adult)   Pulse 106   Temp 97.5 °F (36.4 °C)   Resp 20   Ht 180.3 cm (71\")   Wt 68 kg (150 lb)   SpO2 97%   BMI 20.92 kg/m²   Estimated body mass index is 20.92 kg/m² as calculated from the following:    Height as of this encounter: 180.3 cm (71\").    Weight as of this encounter: 68 kg (150 lb).       BMI is within normal parameters. No other follow-up for BMI required.      Physical Exam  Vitals reviewed.   Constitutional:       Appearance: He is well-developed.   HENT:      Head: Normocephalic and atraumatic.      Right Ear: External ear normal.      Left Ear: External ear normal.      Nose: Nose normal.   Eyes:      General: No scleral icterus.        Right eye: No discharge.         Left eye: No discharge.      Conjunctiva/sclera: Conjunctivae normal.      Pupils: Pupils are equal, round, and reactive to light.   Neck:      Thyroid: No thyromegaly.      Trachea: No tracheal deviation.   Cardiovascular:      Rate and Rhythm: Normal rate and regular rhythm.      Heart sounds: Normal heart sounds. No murmur heard.     No friction rub. No gallop.   Pulmonary:      Effort: Pulmonary effort is normal. No respiratory distress.      Breath sounds: No stridor. Decreased breath sounds and wheezing present. No rales.   Chest:      Chest wall: No tenderness.   Abdominal:      General: Bowel sounds are " normal. There is no distension.      Palpations: Abdomen is soft. There is no mass.      Tenderness: There is no abdominal tenderness. There is no guarding or rebound.      Hernia: No hernia is present.   Musculoskeletal:         General: No deformity. Normal range of motion.      Cervical back: Normal range of motion and neck supple.   Lymphadenopathy:      Cervical: No cervical adenopathy.   Skin:     General: Skin is warm and dry.      Coloration: Skin is not pale.      Findings: No erythema or rash.   Neurological:      Mental Status: He is alert and oriented to person, place, and time.      Cranial Nerves: No cranial nerve deficit.      Motor: No abnormal muscle tone.      Coordination: Coordination normal.      Deep Tendon Reflexes: Reflexes are normal and symmetric. Reflexes normal.   Psychiatric:         Behavior: Behavior normal.         Thought Content: Thought content normal.         Judgment: Judgment normal.            Result Review :    Reviewed Covid-19 and influenza testing on 11/10/23             Assessment and Plan   Diagnoses and all orders for this visit:    1. Bronchitis (Primary)    2. Chest congestion  -     POCT SARS-CoV-2 Antigen OFELIA + Flu    Other orders  -     methylPREDNISolone (MEDROL) 4 MG dose pack; Take as directed on package instructions.  Dispense: 21 tablet; Refill: 0  -     azithromycin (Zithromax Z-Diego) 250 MG tablet; Take 2 tablets by mouth on day 1, then 1 tablet daily on days 2-5  Dispense: 6 tablet; Refill: 0  -     albuterol sulfate  (90 Base) MCG/ACT inhaler; Inhale 2 puffs Every 4 (Four) Hours As Needed for Wheezing.  Dispense: 8 g; Refill: 0    Plan:  Steroid pack  Encouraged OTC Flonase  He says he has gotten pneumonia in the past with bronchitis, will send in a Z-pack.         Follow Up   No follow-ups on file.  Patient was given instructions and counseling regarding his condition or for health maintenance advice. Please see specific information pulled into the  AVS if appropriate.

## 2024-03-01 ENCOUNTER — TELEMEDICINE (OUTPATIENT)
Dept: FAMILY MEDICINE CLINIC | Facility: CLINIC | Age: 29
End: 2024-03-01
Payer: COMMERCIAL

## 2024-03-01 DIAGNOSIS — J30.2 SEASONAL ALLERGIES: ICD-10-CM

## 2024-03-01 PROCEDURE — 99213 OFFICE O/P EST LOW 20 MIN: CPT | Performed by: FAMILY MEDICINE

## 2024-03-01 RX ORDER — LORATADINE 10 MG/1
10 TABLET ORAL DAILY
Qty: 90 TABLET | Refills: 0 | Status: SHIPPED | OUTPATIENT
Start: 2024-03-01

## 2024-03-01 NOTE — PROGRESS NOTES
Subjective   Trent Luigi Phelan is a 28 y.o. male who presents via video visit. He is at home. I am in the office. He is not present for dictation.       History of Present Illness     Seasonal allergies  His only complaint today is flareup of seasonal allergies. He requests to be placed back on his allergy medication that he took previously. Review of chart shows that he was previously prescribed Claritin 10 mg daily. He reports that this medication worked well for him and he would like to restart it.    A review of systems was performed and positive findings are noted in the HPI.      The following portions of the patient's history were reviewed and updated as appropriate: allergies, current medications, past family history, past medical history, past social history, past surgical history, and problem list.        Review of Systems    Objective   There were no vitals taken for this visit.  Physical Exam  Constitutional:       General: He is not in acute distress.     Appearance: Normal appearance. He is not toxic-appearing.   HENT:      Head: Normocephalic and atraumatic.   Pulmonary:      Effort: Pulmonary effort is normal. No respiratory distress.   Neurological:      Mental Status: He is alert and oriented to person, place, and time.   Psychiatric:         Mood and Affect: Mood normal.         Behavior: Behavior normal.         Thought Content: Thought content normal.         Judgment: Judgment normal.         BMI is within normal parameters. No other follow-up for BMI required.       Assessment & Plan   Problems Addressed this Visit    None  Visit Diagnoses       Seasonal allergies        Relevant Medications    loratadine (Claritin) 10 MG tablet          Diagnoses         Codes Comments    Seasonal allergies     ICD-10-CM: J30.2  ICD-9-CM: 477.9           1. Seasonal allergies, recurrent.  We will start the patient back on Claritin.   New prescription sent to pharmacy.   Advised the patient to contact the  office if symptoms are not improving.    10 minutes          Transcribed from ambient dictation for Eliane Victoria MD by Carleen William.  03/01/24   08:44 CST    Patient or patient representative verbalized consent to the visit recording.  I have personally performed the services described in this document as transcribed by the above individual, and it is both accurate and complete.          This document has been electronically signed by Eliane Victoria MD on March 11, 2024 16:01 CDT

## 2024-07-16 ENCOUNTER — APPOINTMENT (OUTPATIENT)
Dept: GENERAL RADIOLOGY | Facility: HOSPITAL | Age: 29
End: 2024-07-16
Payer: COMMERCIAL

## 2024-07-16 ENCOUNTER — HOSPITAL ENCOUNTER (EMERGENCY)
Facility: HOSPITAL | Age: 29
Discharge: LEFT BEFORE DISCHARGE | End: 2024-07-16
Payer: COMMERCIAL

## 2024-07-16 ENCOUNTER — HOSPITAL ENCOUNTER (EMERGENCY)
Age: 29
Discharge: HOME | End: 2024-07-16
Payer: MEDICAID

## 2024-07-16 VITALS
DIASTOLIC BLOOD PRESSURE: 95 MMHG | BODY MASS INDEX: 21.98 KG/M2 | HEIGHT: 71 IN | SYSTOLIC BLOOD PRESSURE: 141 MMHG | TEMPERATURE: 98.8 F | OXYGEN SATURATION: 99 % | HEART RATE: 110 BPM | WEIGHT: 157 LBS | RESPIRATION RATE: 18 BRPM

## 2024-07-16 VITALS
BODY MASS INDEX: 21.98 KG/M2 | OXYGEN SATURATION: 98 % | RESPIRATION RATE: 18 BRPM | HEIGHT: 71 IN | WEIGHT: 157 LBS | SYSTOLIC BLOOD PRESSURE: 140 MMHG | TEMPERATURE: 98.2 F | HEART RATE: 128 BPM | DIASTOLIC BLOOD PRESSURE: 97 MMHG

## 2024-07-16 DIAGNOSIS — R56.9 WITNESSED SEIZURE-LIKE ACTIVITY: Primary | ICD-10-CM

## 2024-07-16 DIAGNOSIS — U07.1 COVID: Primary | ICD-10-CM

## 2024-07-16 DIAGNOSIS — Z20.822 EXPOSURE TO COVID-19 VIRUS: ICD-10-CM

## 2024-07-16 DIAGNOSIS — Z53.29 LEFT AGAINST MEDICAL ADVICE: ICD-10-CM

## 2024-07-16 LAB
AMPHET UR QL SCN: NEGATIVE
BARBITURATES UR QL SCN: NEGATIVE
BENZODIAZ UR QL SCN: NEGATIVE
BILIRUB UR QL STRIP: NEGATIVE
BUPRENORPHINE URINE: NEGATIVE
CANNABINOIDS UR QL SCN: NEGATIVE
CLARITY UR: CLEAR
COCAINE UR QL SCN: NEGATIVE
COLOR UR: YELLOW
DRUG SCREEN COMMENT UR-IMP: NORMAL
FENTANYL SCREEN, URINE: NEGATIVE
GLUCOSE UR STRIP.AUTO-MCNC: NEGATIVE MG/DL
HGB UR STRIP.AUTO-MCNC: NEGATIVE MG/L
KETONES UR STRIP.AUTO-MCNC: NEGATIVE MG/DL
LEUKOCYTE ESTERASE UR QL STRIP.AUTO: NEGATIVE
METHADONE UR QL SCN: NEGATIVE
METHAMPHETAMINE, URINE: NEGATIVE
NITRITE UR QL STRIP.AUTO: NEGATIVE
OPIATES UR QL SCN: NEGATIVE
OXYCODONE UR QL SCN: NEGATIVE
PCP UR QL SCN: NEGATIVE
PH UR STRIP.AUTO: 6.5 [PH] (ref 5–8)
PROT UR STRIP.AUTO-MCNC: NEGATIVE MG/DL
SARS-COV-2 RDRP RESP QL NAA+PROBE: DETECTED
SP GR UR STRIP.AUTO: 1.01 (ref 1–1.03)
TRICYCLIC ANTIDEPRESSANTS, UR: NEGATIVE
UROBILINOGEN UR STRIP.AUTO-MCNC: 1 E.U./DL

## 2024-07-16 PROCEDURE — 99284 EMERGENCY DEPT VISIT MOD MDM: CPT

## 2024-07-16 PROCEDURE — 93005 ELECTROCARDIOGRAM TRACING: CPT | Performed by: STUDENT IN AN ORGANIZED HEALTH CARE EDUCATION/TRAINING PROGRAM

## 2024-07-16 PROCEDURE — 71045 X-RAY EXAM CHEST 1 VIEW: CPT

## 2024-07-16 PROCEDURE — 99283 EMERGENCY DEPT VISIT LOW MDM: CPT

## 2024-07-16 PROCEDURE — G0480 DRUG TEST DEF 1-7 CLASSES: HCPCS

## 2024-07-16 PROCEDURE — 80307 DRUG TEST PRSMV CHEM ANLYZR: CPT

## 2024-07-16 PROCEDURE — 87635 SARS-COV-2 COVID-19 AMP PRB: CPT

## 2024-07-16 PROCEDURE — 81003 URINALYSIS AUTO W/O SCOPE: CPT

## 2024-07-16 RX ORDER — SODIUM CHLORIDE 0.9 % (FLUSH) 0.9 %
10 SYRINGE (ML) INJECTION AS NEEDED
Status: DISCONTINUED | OUTPATIENT
Start: 2024-07-16 | End: 2024-07-16

## 2024-07-16 RX ORDER — SODIUM CHLORIDE, SODIUM LACTATE, POTASSIUM CHLORIDE, CALCIUM CHLORIDE 600; 310; 30; 20 MG/100ML; MG/100ML; MG/100ML; MG/100ML
125 INJECTION, SOLUTION INTRAVENOUS CONTINUOUS
Status: DISCONTINUED | OUTPATIENT
Start: 2024-07-16 | End: 2024-07-16 | Stop reason: HOSPADM

## 2024-07-16 RX ORDER — ACETAMINOPHEN 500 MG
500 TABLET ORAL ONCE
Status: DISCONTINUED | OUTPATIENT
Start: 2024-07-16 | End: 2024-07-16

## 2024-07-16 ASSESSMENT — ENCOUNTER SYMPTOMS
PHOTOPHOBIA: 0
EYE ITCHING: 0
BACK PAIN: 0
APNEA: 0
SHORTNESS OF BREATH: 0
EYE DISCHARGE: 0
COLOR CHANGE: 0
COUGH: 1

## 2024-07-16 ASSESSMENT — LIFESTYLE VARIABLES
HOW MANY STANDARD DRINKS CONTAINING ALCOHOL DO YOU HAVE ON A TYPICAL DAY: PATIENT DOES NOT DRINK
HOW OFTEN DO YOU HAVE A DRINK CONTAINING ALCOHOL: NEVER

## 2024-07-16 ASSESSMENT — PAIN - FUNCTIONAL ASSESSMENT: PAIN_FUNCTIONAL_ASSESSMENT: NONE - DENIES PAIN

## 2024-07-16 NOTE — ED NOTES
"This nurse went into patient's room to start work up. Explained to patient orders that were placed by TY Snider. Patient states he wishes to leave AMA at this time, stating he feels like he \"probably just has the flu\" and states \"I am feeling better, I just want to go ahead and go.\" Spoke with TY Snider about this, states to allow patient to sign AMA form. Form printed and given to patient's nurse, HODAN Barrett.   "

## 2024-07-16 NOTE — ED PROVIDER NOTES
Subjective   History of Present Illness    Patient is a 28-year-old male who presents to ED with wife and daughter.  Chief complaint is seizures and not feeling well today.    Patient describes that he had a history of absence seizures as a child and was followed at [facility].  Patient informs me that when he was on seizure medication, that made it worse.  For a while, his seizures have been dormant but when he was placed on antidepressant medications a few years ago, it worsened his seizures.  He immediately got off of it and it has been fairly well-controlled.  He often would go into seizures if he has some type of triggers such as flashing lights.  His last seizure was a few months ago.  Yesterday, wife states that he was in bed when he suddenly had to what seemed like generalized tonic clonic seizures lasting less than a minute at a time.  He denies any injury from the seizures.  He was postictal afterwards and was sleeping for a few hours.  She kept watching him and denied any further seizure activity since then.  She left to go to an interview today and the patient did stay in bed.  When she came home, the patient was complaining of chest pain and shortness of breath.  He states this happened approximately 30 minutes ago.  He describes as chest pressure with pain radiating down his left upper extremity.  He does complain of shortness of breath.  He denies any nausea vomiting but he feels weak altogether.  He denies any syncope or near syncope.  He denies any recent illness.  He denies fever and cough.  However, he was recently exposed to someone with COVID.  He denies any diarrhea.  He denies any ear pain or sore throat.  He denies any recent medication changes.  He does not follow-up with a neurologist currently.  He denies any premature cardiac history or other known cardiac history.  He denies a history of hypertension, hypercholesterolemia, diabetes, or smoking now.  He quit smoking several years ago.  He  denies any illicit drug use.  He denies any alcohol use.    Review of Systems   Constitutional:  Positive for activity change and fatigue. Negative for diaphoresis.   HENT: Negative.     Respiratory:  Positive for chest tightness and shortness of breath. Negative for cough.    Cardiovascular:  Positive for chest pain. Negative for palpitations.   Gastrointestinal: Negative.    Genitourinary: Negative.    Musculoskeletal: Negative.    Neurological:  Positive for seizures.   Psychiatric/Behavioral: Negative.     All other systems reviewed and are negative.      History reviewed. No pertinent past medical history.    Allergies   Allergen Reactions    Augmentin [Amoxicillin-Pot Clavulanate] Anaphylaxis       Past Surgical History:   Procedure Laterality Date    ADENOIDECTOMY      APPENDECTOMY      BACK SURGERY      EAR TUBES      EYE SURGERY      NECK SURGERY      TONSILLECTOMY         Family History   Problem Relation Age of Onset    Hypertension Father     No Known Problems Sister     No Known Problems Brother     No Known Problems Daughter     Cancer Maternal Grandmother     Diabetes Maternal Grandmother     Cancer Maternal Grandfather     Diabetes Maternal Grandfather     Cancer Paternal Grandmother     Hyperlipidemia Paternal Grandmother     Cancer Paternal Grandfather     Hyperlipidemia Paternal Grandfather        Social History     Socioeconomic History    Marital status:    Tobacco Use    Smoking status: Never    Smokeless tobacco: Former     Types: Chew   Vaping Use    Vaping status: Never Used   Substance and Sexual Activity    Alcohol use: Yes     Comment: occasional    Drug use: Never    Sexual activity: Yes       Prior to Admission medications    Medication Sig Start Date End Date Taking? Authorizing Provider   albuterol sulfate  (90 Base) MCG/ACT inhaler Inhale 2 puffs Every 4 (Four) Hours As Needed for Wheezing. 11/10/23   Derek Blunt MD   azithromycin (Zithromax Z-Diego) 250 MG  "tablet Take 2 tablets by mouth on day 1, then 1 tablet daily on days 2-5 11/10/23   Derek Blunt MD   busPIRone (BUSPAR) 5 MG tablet Take 1 tablet by mouth 3 (Three) Times a Day As Needed (anxiety). for anxiety 6/9/23   Eliane Victoria MD   loratadine (Claritin) 10 MG tablet Take 1 tablet by mouth Daily. 3/1/24   Eliane Victoria MD   meloxicam (Mobic) 15 MG tablet Take 1 tablet by mouth Daily. 6/9/23   Eliane Victoria MD   methylPREDNISolone (MEDROL) 4 MG dose pack Take as directed on package instructions. 11/10/23   Derek Blunt MD   multivitamin (THERAGRAN) tablet tablet Take 1 tablet by mouth Daily.    Provider, MD Viet   ondansetron ODT (Zofran ODT) 4 MG disintegrating tablet Place 1 tablet on the tongue Every 8 (Eight) Hours As Needed for Nausea or Vomiting. 8/26/22   Josephine Rebolledo APRN       Medications   lactated ringers infusion (has no administration in time range)       /95   Pulse 110   Temp 98.8 °F (37.1 °C) (Oral)   Resp 18   Ht 180.3 cm (71\")   Wt 71.2 kg (157 lb)   SpO2 99%   BMI 21.90 kg/m²       Objective   Physical Exam  Vitals reviewed.   Constitutional:       Appearance: He is well-developed.   HENT:      Head: Normocephalic and atraumatic.      Right Ear: External ear normal.      Left Ear: External ear normal.      Nose: Nose normal.   Eyes:      Conjunctiva/sclera: Conjunctivae normal.      Pupils: Pupils are equal, round, and reactive to light.   Neck:      Trachea: No tracheal deviation.   Cardiovascular:      Rate and Rhythm: Regular rhythm. Tachycardia present.      Heart sounds: Normal heart sounds. No murmur heard.     No friction rub. No gallop.   Pulmonary:      Effort: Pulmonary effort is normal. No respiratory distress.      Breath sounds: Normal breath sounds. No wheezing or rales.   Chest:      Chest wall: No tenderness.   Abdominal:      General: Bowel sounds are normal. There is no distension.      Palpations: Abdomen " is soft. There is no mass.      Tenderness: There is no abdominal tenderness. There is no guarding or rebound.   Musculoskeletal:         General: No tenderness or deformity. Normal range of motion.      Cervical back: Normal range of motion and neck supple.   Skin:     General: Skin is warm and dry.      Capillary Refill: Capillary refill takes less than 2 seconds.      Coloration: Skin is not pale.      Findings: No erythema or rash.   Neurological:      Mental Status: He is alert and oriented to person, place, and time.   Psychiatric:         Mood and Affect: Mood is anxious.         Speech: Speech is rapid and pressured.         Behavior: Behavior is agitated.         Procedures         Lab Results (last 24 hours)       ** No results found for the last 24 hours. **            XR Chest 1 View    Result Date: 7/16/2024  Narrative: EXAM/TECHNIQUE: XR CHEST 1 VW-  INDICATION: Chest pain, shortness of breath  COMPARISON: None available.  FINDINGS:  Cardiac silhouette is normal size. No pleural effusion, pneumothorax, or focal consolidation. No acute osseous finding.      Impression: No acute findings.  This report was signed and finalized on 7/16/2024 5:28 PM by Dr. Luigi Vizcarra MD.       ED Course  ED Course as of 07/16/24 1849 Tue Jul 16, 2024 1848 Patient informed the nurse that he was leaving his immobilized.  He reports feeling much better.  Risk and benefits have been discussed.  Patient is welcome to the ER for any acute issues.  Recommended for him to follow-up with neurologist for his history of seizures.  Patient and wife voiced understanding.  No further treatment like to be completed at this time. [TK]      ED Course User Index  [TK] Manuel Dos Santos PA          [facility]      Final diagnoses:   Witnessed seizure-like activity   Exposure to COVID-19 virus   Left against medical advice     Disposition: Patient left AGAINST MEDICAL ADVICE so no further treatment plan can be completed.       King  PetronauTY Paz  07/16/24 9782

## 2024-07-17 LAB
EKG P AXIS: 63 DEGREES
EKG P-R INTERVAL: 130 MS
EKG Q-T INTERVAL: 282 MS
EKG QRS DURATION: 92 MS
EKG QTC CALCULATION (BAZETT): 397 MS
EKG T AXIS: -5 DEGREES

## 2024-07-17 PROCEDURE — 93010 ELECTROCARDIOGRAM REPORT: CPT | Performed by: INTERNAL MEDICINE

## 2024-07-17 NOTE — ED PROVIDER NOTES
[facility] EMERGENCY DEPT  eMERGENCYdEPARTMENT eNCOUnter      Pt Name: Geovany Mckeon  MRN: [id number]  Birthdate 1995  Date of evaluation: 7/16/2024  Provider:WADE Matt    CHIEF COMPLAINT       Chief Complaint   Patient presents with    Tachycardia    Fever    Cough         HISTORY OF PRESENT ILLNESS  (Location/Symptom, Timing/Onset, Context/Setting, Quality, Duration, Modifying Factors, Severity.)   Geovany Mckeon is a 28 y.o. male who presents to the emergency department with cough congestion body aches. Fever at home took tylenol seizure hx had two yesterday no head injury. Covid exposure known with grandmother. Denies chest pain or pleurisy. Seen at [facility] had negative chest XR. No swab done.     [facility]    Nursing Notes were reviewed and I agree.    REVIEW OF SYSTEMS    (2-9 systems for level 4, 10 or more for level 5)     Review of Systems   Constitutional:  Positive for fever. Negative for activity change, appetite change and chills.   HENT:  Negative for congestion and dental problem.    Eyes:  Negative for photophobia, discharge and itching.   Respiratory:  Positive for cough. Negative for apnea and shortness of breath.    Cardiovascular:  Negative for chest pain.   Musculoskeletal:  Positive for myalgias. Negative for arthralgias, back pain, gait problem and neck pain.   Skin:  Negative for color change, pallor and rash.   Neurological:  Negative for dizziness, seizures and syncope.   Psychiatric/Behavioral:  Negative for agitation. The patient is not nervous/anxious.         Except as noted above the remainder of the review of systems was reviewed and negative.       PAST MEDICAL HISTORY   No past medical history on file.      SURGICAL HISTORY     No past surgical history on file.      CURRENT MEDICATIONS       Discharge Medication List as of 7/16/2024 10:22 PM          ALLERGIES     Augmentin [amoxicillin-pot clavulanate]    FAMILY HISTORY     No family history on file.       SOCIAL HISTORY       Social History

## 2024-09-03 ENCOUNTER — OFFICE VISIT (OUTPATIENT)
Age: 29
End: 2024-09-03

## 2024-09-03 VITALS
TEMPERATURE: 97.8 F | HEART RATE: 91 BPM | SYSTOLIC BLOOD PRESSURE: 154 MMHG | DIASTOLIC BLOOD PRESSURE: 84 MMHG | WEIGHT: 162.8 LBS | OXYGEN SATURATION: 98 % | RESPIRATION RATE: 20 BRPM | BODY MASS INDEX: 22.05 KG/M2 | HEIGHT: 72 IN

## 2024-09-03 DIAGNOSIS — R03.0 ELEVATED BLOOD PRESSURE READING: ICD-10-CM

## 2024-09-03 DIAGNOSIS — J01.90 ACUTE RHINOSINUSITIS: Primary | ICD-10-CM

## 2024-09-03 DIAGNOSIS — R05.9 COUGH, UNSPECIFIED TYPE: ICD-10-CM

## 2024-09-03 DIAGNOSIS — J02.9 SORE THROAT: ICD-10-CM

## 2024-09-03 DIAGNOSIS — Z75.8 DOES NOT HAVE PRIMARY CARE PROVIDER: ICD-10-CM

## 2024-09-03 DIAGNOSIS — R52 BODY ACHES: ICD-10-CM

## 2024-09-03 DIAGNOSIS — R09.81 SINUS CONGESTION: ICD-10-CM

## 2024-09-03 LAB
INFLUENZA A ANTIBODY: NORMAL
INFLUENZA B ANTIBODY: NORMAL
Lab: NORMAL
QC PASS/FAIL: NORMAL
S PYO AG THROAT QL: NORMAL
SARS-COV-2, POC: NORMAL

## 2024-09-03 RX ORDER — AZITHROMYCIN 250 MG/1
TABLET, FILM COATED ORAL
Qty: 6 TABLET | Refills: 0 | Status: SHIPPED | OUTPATIENT
Start: 2024-09-03 | End: 2024-09-13

## 2024-09-03 RX ORDER — BROMPHENIRAMINE MALEATE, PSEUDOEPHEDRINE HYDROCHLORIDE, AND DEXTROMETHORPHAN HYDROBROMIDE 2; 30; 10 MG/5ML; MG/5ML; MG/5ML
5-10 SYRUP ORAL 4 TIMES DAILY PRN
Qty: 200 ML | Refills: 0 | Status: SHIPPED | OUTPATIENT
Start: 2024-09-03 | End: 2024-09-08

## 2024-09-03 ASSESSMENT — ENCOUNTER SYMPTOMS
SINUS PRESSURE: 1
CONSTIPATION: 0
EYE PAIN: 0
CHOKING: 0
VOMITING: 0
NAUSEA: 0
ABDOMINAL PAIN: 0
SINUS PAIN: 0
SHORTNESS OF BREATH: 0
ABDOMINAL DISTENTION: 0
EYE DISCHARGE: 0
STRIDOR: 0
EYE REDNESS: 0
COUGH: 1
CHEST TIGHTNESS: 0
SORE THROAT: 1
DIARRHEA: 0
WHEEZING: 0
TROUBLE SWALLOWING: 0
APNEA: 0
SINUS COMPLAINT: 1
FACIAL SWELLING: 0
COLOR CHANGE: 0

## 2024-09-03 NOTE — PATIENT INSTRUCTIONS
Strep, flu, and COVID testing was negative today    Z-pack prescribed. Take full course of antibiotics    Bromfed prescribed as needed for cough    Increase fluid intake    Recommended OTC mucinex     May use OTC claritin/zyrtec and nasal spray such as flonase to help symptoms    If patient is not improving or developing any new/worsening symptoms then return to clinic or f/u with PCP    Note given for today    Any condition can change, despite proper treatment. Therefore, if symptoms still persist or worsen after treatment plan intitated today, either go to the nearest ER, or call PCP, or return to  for further evaluation.    Urgent Care evaluation today is not a substitute for PCP visit. Follow up care is your responsibility to discuss and review this  visit.        Elevated Blood Pressure Reading  Blood pressure was elevated in office today. Discussed this with patient in office.    1st BP reading was 160/80    2nd BP reading was 154/84    Monitor salt intake    Encouraged physical activity.    - Get at least 30 minutes of exercise on most days of the week   - Walking is a good choice   - You also may want to do other activities, such as running, swimming, cycling, or playing tennis or team sports.    Avoid or limit alcohol. Talk to your doctor about whether you can drink any alcohol.    Eat plenty of fruits, vegetables, and low-fat dairy products. Eat less saturated and total fats.    Recommended learning how to check your blood pressure at home.

## 2024-09-03 NOTE — PROGRESS NOTES
flonase to help symptoms    If patient is not improving or developing any new/worsening symptoms then return to clinic or f/u with PCP    Note given for today    Any condition can change, despite proper treatment. Therefore, if symptoms still persist or worsen after treatment plan intitated today, either go to the nearest ER, or call PCP, or return to  for further evaluation.    Urgent Care evaluation today is not a substitute for PCP visit. Follow up care is your responsibility to discuss and review this UC visit.    Discussed use, benefits, and side effects of any prescribed medications. All patient questions were answered. Patient demonstrates understanding and agrees with care plan. Patient was given educational materials - see patient instructions below           Elevated Blood Pressure Reading  Blood pressure was elevated in office today. Discussed this with patient in office.    1st BP reading was 160/80    2nd BP reading was 154/84    Monitor salt intake    Encouraged physical activity.    - Get at least 30 minutes of exercise on most days of the week   - Walking is a good choice   - You also may want to do other activities, such as running, swimming, cycling, or playing tennis or team sports.    Avoid or limit alcohol. Talk to your doctor about whether you can drink any alcohol.    Eat plenty of fruits, vegetables, and low-fat dairy products. Eat less saturated and total fats.    Recommended learning how to check your blood pressure at home.      Orders Placed This Encounter   Procedures    [facility]     Referral Priority:   Routine     Referral Type:   Eval and Treat     Referral Reason:   Specialty Services Required     Number of Visits Requested:   1    POCT rapid strep A    POCT Influenza A/B    POCT COVID-19 Antigen Card     Order Specific Question:   Pregnant:     Answer:   No       Results for orders placed or performed in visit on 09/03/24   POCT rapid strep A   Result Value Ref

## 2024-09-16 ENCOUNTER — OFFICE VISIT (OUTPATIENT)
Dept: PRIMARY CARE CLINIC | Age: 29
End: 2024-09-16
Payer: MEDICAID

## 2024-09-16 VITALS
OXYGEN SATURATION: 98 % | SYSTOLIC BLOOD PRESSURE: 140 MMHG | DIASTOLIC BLOOD PRESSURE: 92 MMHG | BODY MASS INDEX: 20.99 KG/M2 | RESPIRATION RATE: 18 BRPM | TEMPERATURE: 97 F | WEIGHT: 155 LBS | HEART RATE: 88 BPM | HEIGHT: 72 IN

## 2024-09-16 DIAGNOSIS — E55.9 VITAMIN D DEFICIENCY: ICD-10-CM

## 2024-09-16 DIAGNOSIS — F32.A ANXIETY AND DEPRESSION: Primary | ICD-10-CM

## 2024-09-16 DIAGNOSIS — Z13.220 SCREENING FOR LIPID DISORDERS: ICD-10-CM

## 2024-09-16 DIAGNOSIS — B07.9 VIRAL WARTS, UNSPECIFIED TYPE: ICD-10-CM

## 2024-09-16 DIAGNOSIS — R56.9 WITNESSED SEIZURE-LIKE ACTIVITY (HCC): ICD-10-CM

## 2024-09-16 DIAGNOSIS — F41.9 ANXIETY AND DEPRESSION: Primary | ICD-10-CM

## 2024-09-16 DIAGNOSIS — I10 HYPERTENSION, UNSPECIFIED TYPE: ICD-10-CM

## 2024-09-16 DIAGNOSIS — Z86.69 HISTORY OF ABSENCE SEIZURES: ICD-10-CM

## 2024-09-16 DIAGNOSIS — G44.89 OTHER HEADACHE SYNDROME: ICD-10-CM

## 2024-09-16 DIAGNOSIS — Z13.1 SCREENING FOR DIABETES MELLITUS (DM): ICD-10-CM

## 2024-09-16 LAB
25(OH)D3 SERPL-MCNC: 28.3 NG/ML
ALBUMIN SERPL-MCNC: 5.3 G/DL (ref 3.5–5.2)
ALP SERPL-CCNC: 87 U/L (ref 40–129)
ALT SERPL-CCNC: 25 U/L (ref 5–41)
ANION GAP SERPL CALCULATED.3IONS-SCNC: 15 MMOL/L (ref 7–19)
AST SERPL-CCNC: 24 U/L (ref 5–40)
BASOPHILS # BLD: 0.1 K/UL (ref 0–0.2)
BASOPHILS NFR BLD: 0.8 % (ref 0–1)
BILIRUB SERPL-MCNC: 0.8 MG/DL (ref 0.2–1.2)
BUN SERPL-MCNC: 17 MG/DL (ref 6–20)
CALCIUM SERPL-MCNC: 9.7 MG/DL (ref 8.6–10)
CHLORIDE SERPL-SCNC: 101 MMOL/L (ref 98–111)
CHOLEST SERPL-MCNC: 213 MG/DL (ref 0–199)
CO2 SERPL-SCNC: 27 MMOL/L (ref 22–29)
CREAT SERPL-MCNC: 0.9 MG/DL (ref 0.7–1.2)
EOSINOPHIL # BLD: 0.2 K/UL (ref 0–0.6)
EOSINOPHIL NFR BLD: 2.5 % (ref 0–5)
ERYTHROCYTE [DISTWIDTH] IN BLOOD BY AUTOMATED COUNT: 12.4 % (ref 11.5–14.5)
GLUCOSE SERPL-MCNC: 87 MG/DL (ref 70–99)
HBA1C MFR BLD: 4.9 % (ref 4–5.6)
HCT VFR BLD AUTO: 52.5 % (ref 42–52)
HDLC SERPL-MCNC: 61 MG/DL (ref 40–60)
HGB BLD-MCNC: 17.9 G/DL (ref 14–18)
IMM GRANULOCYTES # BLD: 0 K/UL
LDLC SERPL CALC-MCNC: 135 MG/DL
LYMPHOCYTES # BLD: 1.6 K/UL (ref 1.1–4.5)
LYMPHOCYTES NFR BLD: 23 % (ref 20–40)
MCH RBC QN AUTO: 30 PG (ref 27–31)
MCHC RBC AUTO-ENTMCNC: 34.1 G/DL (ref 33–37)
MCV RBC AUTO: 87.9 FL (ref 80–94)
MONOCYTES # BLD: 0.5 K/UL (ref 0–0.9)
MONOCYTES NFR BLD: 6.7 % (ref 0–10)
NEUTROPHILS # BLD: 4.8 K/UL (ref 1.5–7.5)
NEUTS SEG NFR BLD: 66.9 % (ref 50–65)
PLATELET # BLD AUTO: 206 K/UL (ref 130–400)
PMV BLD AUTO: 10.8 FL (ref 9.4–12.4)
POTASSIUM SERPL-SCNC: 4.1 MMOL/L (ref 3.5–5)
PROT SERPL-MCNC: 8.1 G/DL (ref 6.4–8.3)
RBC # BLD AUTO: 5.97 M/UL (ref 4.7–6.1)
SODIUM SERPL-SCNC: 143 MMOL/L (ref 136–145)
T4 FREE SERPL-MCNC: 1.27 NG/DL (ref 0.93–1.7)
TRIGL SERPL-MCNC: 85 MG/DL (ref 0–149)
TSH SERPL DL<=0.005 MIU/L-ACNC: 1.52 UIU/ML (ref 0.27–4.2)
WBC # BLD AUTO: 7.1 K/UL (ref 4.8–10.8)

## 2024-09-16 PROCEDURE — G8427 DOCREV CUR MEDS BY ELIG CLIN: HCPCS | Performed by: NURSE PRACTITIONER

## 2024-09-16 PROCEDURE — 3080F DIAST BP >= 90 MM HG: CPT | Performed by: NURSE PRACTITIONER

## 2024-09-16 PROCEDURE — 3077F SYST BP >= 140 MM HG: CPT | Performed by: NURSE PRACTITIONER

## 2024-09-16 PROCEDURE — 4004F PT TOBACCO SCREEN RCVD TLK: CPT | Performed by: NURSE PRACTITIONER

## 2024-09-16 PROCEDURE — 99204 OFFICE O/P NEW MOD 45 MIN: CPT | Performed by: NURSE PRACTITIONER

## 2024-09-16 PROCEDURE — G8420 CALC BMI NORM PARAMETERS: HCPCS | Performed by: NURSE PRACTITIONER

## 2024-09-16 RX ORDER — LISINOPRIL 20 MG/1
20 TABLET ORAL DAILY
Qty: 30 TABLET | Refills: 5 | Status: SHIPPED | OUTPATIENT
Start: 2024-09-16

## 2024-09-16 RX ORDER — SUMATRIPTAN 25 MG/1
25 TABLET, FILM COATED ORAL
Qty: 9 TABLET | Refills: 0 | Status: SHIPPED | OUTPATIENT
Start: 2024-09-16 | End: 2024-09-19

## 2024-09-16 RX ORDER — MONTELUKAST SODIUM 10 MG/1
10 TABLET ORAL DAILY
Qty: 30 TABLET | Refills: 3 | Status: SHIPPED | OUTPATIENT
Start: 2024-09-16

## 2024-09-16 SDOH — ECONOMIC STABILITY: FOOD INSECURITY: WITHIN THE PAST 12 MONTHS, THE FOOD YOU BOUGHT JUST DIDN'T LAST AND YOU DIDN'T HAVE MONEY TO GET MORE.: NEVER TRUE

## 2024-09-16 SDOH — ECONOMIC STABILITY: FOOD INSECURITY: WITHIN THE PAST 12 MONTHS, YOU WORRIED THAT YOUR FOOD WOULD RUN OUT BEFORE YOU GOT MONEY TO BUY MORE.: NEVER TRUE

## 2024-09-16 SDOH — ECONOMIC STABILITY: INCOME INSECURITY: HOW HARD IS IT FOR YOU TO PAY FOR THE VERY BASICS LIKE FOOD, HOUSING, MEDICAL CARE, AND HEATING?: SOMEWHAT HARD

## 2024-09-16 ASSESSMENT — PATIENT HEALTH QUESTIONNAIRE - PHQ9
2. FEELING DOWN, DEPRESSED OR HOPELESS: MORE THAN HALF THE DAYS
SUM OF ALL RESPONSES TO PHQ9 QUESTIONS 1 & 2: 4
SUM OF ALL RESPONSES TO PHQ QUESTIONS 1-9: 14
6. FEELING BAD ABOUT YOURSELF - OR THAT YOU ARE A FAILURE OR HAVE LET YOURSELF OR YOUR FAMILY DOWN: MORE THAN HALF THE DAYS
3. TROUBLE FALLING OR STAYING ASLEEP: SEVERAL DAYS
4. FEELING TIRED OR HAVING LITTLE ENERGY: SEVERAL DAYS
7. TROUBLE CONCENTRATING ON THINGS, SUCH AS READING THE NEWSPAPER OR WATCHING TELEVISION: MORE THAN HALF THE DAYS
10. IF YOU CHECKED OFF ANY PROBLEMS, HOW DIFFICULT HAVE THESE PROBLEMS MADE IT FOR YOU TO DO YOUR WORK, TAKE CARE OF THINGS AT HOME, OR GET ALONG WITH OTHER PEOPLE: SOMEWHAT DIFFICULT
8. MOVING OR SPEAKING SO SLOWLY THAT OTHER PEOPLE COULD HAVE NOTICED. OR THE OPPOSITE, BEING SO FIGETY OR RESTLESS THAT YOU HAVE BEEN MOVING AROUND A LOT MORE THAN USUAL: MORE THAN HALF THE DAYS
SUM OF ALL RESPONSES TO PHQ QUESTIONS 1-9: 14
5. POOR APPETITE OR OVEREATING: MORE THAN HALF THE DAYS
SUM OF ALL RESPONSES TO PHQ QUESTIONS 1-9: 14
1. LITTLE INTEREST OR PLEASURE IN DOING THINGS: MORE THAN HALF THE DAYS
SUM OF ALL RESPONSES TO PHQ QUESTIONS 1-9: 14
9. THOUGHTS THAT YOU WOULD BE BETTER OFF DEAD, OR OF HURTING YOURSELF: NOT AT ALL

## 2024-09-16 ASSESSMENT — ENCOUNTER SYMPTOMS
SHORTNESS OF BREATH: 1
EYES NEGATIVE: 1
CONSTIPATION: 1
NAUSEA: 0
DIARRHEA: 0
ALLERGIC/IMMUNOLOGIC NEGATIVE: 1
ABDOMINAL PAIN: 0
COUGH: 1
ABDOMINAL DISTENTION: 0
WHEEZING: 0
VOMITING: 0

## 2024-09-18 PROBLEM — G44.89 OTHER HEADACHE SYNDROME: Status: ACTIVE | Noted: 2024-09-18

## 2024-09-19 ENCOUNTER — TELEPHONE (OUTPATIENT)
Dept: NEUROLOGY | Age: 29
End: 2024-09-19

## 2024-09-19 ENCOUNTER — OFFICE VISIT (OUTPATIENT)
Dept: PRIMARY CARE CLINIC | Age: 29
End: 2024-09-19
Payer: MEDICAID

## 2024-09-19 VITALS
HEIGHT: 72 IN | BODY MASS INDEX: 20.91 KG/M2 | WEIGHT: 154.4 LBS | SYSTOLIC BLOOD PRESSURE: 122 MMHG | OXYGEN SATURATION: 98 % | TEMPERATURE: 97 F | RESPIRATION RATE: 18 BRPM | HEART RATE: 82 BPM | DIASTOLIC BLOOD PRESSURE: 86 MMHG

## 2024-09-19 DIAGNOSIS — F41.9 ANXIETY AND DEPRESSION: ICD-10-CM

## 2024-09-19 DIAGNOSIS — F32.A ANXIETY AND DEPRESSION: ICD-10-CM

## 2024-09-19 DIAGNOSIS — R45.4 ANGER: Primary | ICD-10-CM

## 2024-09-19 DIAGNOSIS — F39 MOOD DISORDER (HCC): ICD-10-CM

## 2024-09-19 PROCEDURE — 99214 OFFICE O/P EST MOD 30 MIN: CPT | Performed by: NURSE PRACTITIONER

## 2024-09-19 PROCEDURE — G8427 DOCREV CUR MEDS BY ELIG CLIN: HCPCS | Performed by: NURSE PRACTITIONER

## 2024-09-19 PROCEDURE — 1036F TOBACCO NON-USER: CPT | Performed by: NURSE PRACTITIONER

## 2024-09-19 PROCEDURE — G8420 CALC BMI NORM PARAMETERS: HCPCS | Performed by: NURSE PRACTITIONER

## 2024-09-19 RX ORDER — BUSPIRONE HYDROCHLORIDE 10 MG/1
10 TABLET ORAL 3 TIMES DAILY
Qty: 90 TABLET | Refills: 3 | Status: SHIPPED | OUTPATIENT
Start: 2024-09-19

## 2024-09-19 RX ORDER — LAMOTRIGINE 25 MG/1
TABLET ORAL
Qty: 60 TABLET | Refills: 0 | Status: SHIPPED | OUTPATIENT
Start: 2024-09-19

## 2024-09-19 ASSESSMENT — ENCOUNTER SYMPTOMS
COUGH: 0
GASTROINTESTINAL NEGATIVE: 1
RESPIRATORY NEGATIVE: 1
ALLERGIC/IMMUNOLOGIC NEGATIVE: 1
EYES NEGATIVE: 1
SHORTNESS OF BREATH: 0
WHEEZING: 0

## 2024-10-14 RX ORDER — LAMOTRIGINE 25 MG/1
TABLET ORAL
Qty: 42 TABLET | Refills: 0 | Status: SHIPPED | OUTPATIENT
Start: 2024-10-14

## 2024-10-24 ENCOUNTER — OFFICE VISIT (OUTPATIENT)
Dept: PRIMARY CARE CLINIC | Age: 29
End: 2024-10-24
Payer: MEDICAID

## 2024-10-24 VITALS
OXYGEN SATURATION: 99 % | DIASTOLIC BLOOD PRESSURE: 86 MMHG | WEIGHT: 158 LBS | TEMPERATURE: 98.3 F | SYSTOLIC BLOOD PRESSURE: 138 MMHG | BODY MASS INDEX: 21.43 KG/M2 | HEART RATE: 88 BPM

## 2024-10-24 DIAGNOSIS — F41.9 ANXIETY AND DEPRESSION: ICD-10-CM

## 2024-10-24 DIAGNOSIS — R45.4 ANGER: Primary | ICD-10-CM

## 2024-10-24 DIAGNOSIS — I10 HYPERTENSION, UNSPECIFIED TYPE: ICD-10-CM

## 2024-10-24 DIAGNOSIS — R00.0 TACHYCARDIA: ICD-10-CM

## 2024-10-24 DIAGNOSIS — F32.A ANXIETY AND DEPRESSION: ICD-10-CM

## 2024-10-24 DIAGNOSIS — J45.40 MODERATE PERSISTENT REACTIVE AIRWAY DISEASE WITHOUT COMPLICATION: ICD-10-CM

## 2024-10-24 PROCEDURE — 3075F SYST BP GE 130 - 139MM HG: CPT | Performed by: NURSE PRACTITIONER

## 2024-10-24 PROCEDURE — G8484 FLU IMMUNIZE NO ADMIN: HCPCS | Performed by: NURSE PRACTITIONER

## 2024-10-24 PROCEDURE — 99214 OFFICE O/P EST MOD 30 MIN: CPT | Performed by: NURSE PRACTITIONER

## 2024-10-24 PROCEDURE — G8420 CALC BMI NORM PARAMETERS: HCPCS | Performed by: NURSE PRACTITIONER

## 2024-10-24 PROCEDURE — 3079F DIAST BP 80-89 MM HG: CPT | Performed by: NURSE PRACTITIONER

## 2024-10-24 PROCEDURE — 1036F TOBACCO NON-USER: CPT | Performed by: NURSE PRACTITIONER

## 2024-10-24 PROCEDURE — G8427 DOCREV CUR MEDS BY ELIG CLIN: HCPCS | Performed by: NURSE PRACTITIONER

## 2024-10-24 RX ORDER — LOSARTAN POTASSIUM AND HYDROCHLOROTHIAZIDE 12.5; 1 MG/1; MG/1
1 TABLET ORAL DAILY
Qty: 90 TABLET | Refills: 1 | Status: SHIPPED | OUTPATIENT
Start: 2024-10-24

## 2024-10-24 RX ORDER — CETIRIZINE HYDROCHLORIDE 10 MG/1
10 TABLET ORAL DAILY
Qty: 30 TABLET | Refills: 0 | Status: SHIPPED | OUTPATIENT
Start: 2024-10-24

## 2024-10-24 RX ORDER — MOMETASONE FUROATE AND FORMOTEROL FUMARATE DIHYDRATE 50; 5 UG/1; UG/1
2 AEROSOL RESPIRATORY (INHALATION) 2 TIMES DAILY
Qty: 2 EACH | Refills: 1 | Status: SHIPPED | OUTPATIENT
Start: 2024-10-24

## 2024-10-24 NOTE — PROGRESS NOTES
years Vaccine  Aged Out    Depression Screen  Discontinued       Subjective:      Review of Systems   Constitutional: Negative.  Negative for chills, fatigue, fever and unexpected weight change.   HENT:  Positive for congestion.    Eyes: Negative.    Respiratory:  Positive for cough and wheezing. Negative for shortness of breath.    Cardiovascular: Negative.  Negative for chest pain.   Gastrointestinal: Negative.    Endocrine: Negative.    Genitourinary: Negative.    Musculoskeletal: Negative.    Skin: Negative.    Allergic/Immunologic: Negative.    Neurological: Negative.  Negative for headaches.   Hematological: Negative.    Psychiatric/Behavioral:  Positive for agitation, behavioral problems and dysphoric mood. Negative for self-injury, sleep disturbance and suicidal ideas. The patient is nervous/anxious.        Objective:     Physical Exam  Vitals and nursing note reviewed.   Constitutional:       General: He is not in acute distress.     Appearance: Normal appearance. He is not ill-appearing or toxic-appearing.   HENT:      Head: Normocephalic and atraumatic.      Nose: Nose normal.      Mouth/Throat:      Mouth: Mucous membranes are moist.   Eyes:      Pupils: Pupils are equal, round, and reactive to light.   Cardiovascular:      Rate and Rhythm: Normal rate and regular rhythm.      Pulses: Normal pulses.      Heart sounds: Normal heart sounds. No murmur heard.  Pulmonary:      Effort: Pulmonary effort is normal. No respiratory distress.      Breath sounds: Normal breath sounds. No wheezing, rhonchi or rales.      Comments: Dry cough noted with deep inhalations and just sporadic  Abdominal:      General: Bowel sounds are normal.      Palpations: Abdomen is soft.   Musculoskeletal:         General: Normal range of motion.      Cervical back: Normal range of motion.      Right lower leg: No edema.      Left lower leg: No edema.   Skin:     General: Skin is warm and dry.   Neurological:      Mental Status: He is

## 2024-10-25 ASSESSMENT — ENCOUNTER SYMPTOMS
SHORTNESS OF BREATH: 0
ALLERGIC/IMMUNOLOGIC NEGATIVE: 1
EYES NEGATIVE: 1
GASTROINTESTINAL NEGATIVE: 1
WHEEZING: 1
COUGH: 1

## 2024-11-04 RX ORDER — LAMOTRIGINE 25 MG/1
TABLET ORAL
Qty: 42 TABLET | Refills: 0 | Status: SHIPPED | OUTPATIENT
Start: 2024-11-04 | End: 2024-11-05 | Stop reason: DRUGHIGH

## 2024-11-05 ENCOUNTER — TELEPHONE (OUTPATIENT)
Dept: PRIMARY CARE CLINIC | Age: 29
End: 2024-11-05

## 2024-11-05 RX ORDER — LAMOTRIGINE 100 MG/1
100 TABLET ORAL DAILY
Qty: 30 TABLET | Refills: 3 | Status: SHIPPED | OUTPATIENT
Start: 2024-11-05

## 2024-11-06 NOTE — TELEPHONE ENCOUNTER
Pt notified  
Pt requesting that Lamictal be changed to 100 mg and he would take 50 mg in the AM and 50 mg HS. Pt states this was discussed at visit.   
Satisfactory

## 2024-11-15 RX ORDER — CETIRIZINE HYDROCHLORIDE 10 MG/1
10 TABLET ORAL DAILY
Qty: 30 TABLET | Refills: 0 | Status: SHIPPED | OUTPATIENT
Start: 2024-11-15

## 2024-11-19 ENCOUNTER — OFFICE VISIT (OUTPATIENT)
Dept: PRIMARY CARE CLINIC | Age: 29
End: 2024-11-19
Payer: MEDICAID

## 2024-11-19 VITALS
HEART RATE: 84 BPM | DIASTOLIC BLOOD PRESSURE: 84 MMHG | RESPIRATION RATE: 16 BRPM | TEMPERATURE: 97.4 F | BODY MASS INDEX: 22.08 KG/M2 | HEIGHT: 72 IN | WEIGHT: 163 LBS | SYSTOLIC BLOOD PRESSURE: 126 MMHG | OXYGEN SATURATION: 98 %

## 2024-11-19 DIAGNOSIS — F32.A ANXIETY AND DEPRESSION: ICD-10-CM

## 2024-11-19 DIAGNOSIS — F39 MOOD DISORDER (HCC): ICD-10-CM

## 2024-11-19 DIAGNOSIS — F41.9 ANXIETY AND DEPRESSION: ICD-10-CM

## 2024-11-19 DIAGNOSIS — R45.4 ANGER: Primary | ICD-10-CM

## 2024-11-19 PROCEDURE — 99215 OFFICE O/P EST HI 40 MIN: CPT | Performed by: NURSE PRACTITIONER

## 2024-11-19 PROCEDURE — G8427 DOCREV CUR MEDS BY ELIG CLIN: HCPCS | Performed by: NURSE PRACTITIONER

## 2024-11-19 PROCEDURE — 1036F TOBACCO NON-USER: CPT | Performed by: NURSE PRACTITIONER

## 2024-11-19 PROCEDURE — G8484 FLU IMMUNIZE NO ADMIN: HCPCS | Performed by: NURSE PRACTITIONER

## 2024-11-19 PROCEDURE — G8420 CALC BMI NORM PARAMETERS: HCPCS | Performed by: NURSE PRACTITIONER

## 2024-11-19 RX ORDER — LAMOTRIGINE 25 MG/1
25 TABLET ORAL DAILY
Qty: 60 TABLET | Refills: 3 | Status: SHIPPED | OUTPATIENT
Start: 2024-11-19

## 2024-11-19 RX ORDER — BUPROPION HYDROCHLORIDE 150 MG/1
TABLET ORAL
Qty: 180 TABLET | Refills: 3 | Status: SHIPPED | OUTPATIENT
Start: 2024-11-19 | End: 2024-11-21 | Stop reason: ALTCHOICE

## 2024-11-19 ASSESSMENT — PATIENT HEALTH QUESTIONNAIRE - PHQ9
SUM OF ALL RESPONSES TO PHQ QUESTIONS 1-9: 2
SUM OF ALL RESPONSES TO PHQ9 QUESTIONS 1 & 2: 2
SUM OF ALL RESPONSES TO PHQ QUESTIONS 1-9: 2
1. LITTLE INTEREST OR PLEASURE IN DOING THINGS: SEVERAL DAYS
2. FEELING DOWN, DEPRESSED OR HOPELESS: SEVERAL DAYS

## 2024-11-19 ASSESSMENT — ENCOUNTER SYMPTOMS
SHORTNESS OF BREATH: 0
EYES NEGATIVE: 1
COUGH: 0
RESPIRATORY NEGATIVE: 1
ALLERGIC/IMMUNOLOGIC NEGATIVE: 1
GASTROINTESTINAL NEGATIVE: 1
WHEEZING: 0

## 2024-11-19 ASSESSMENT — ANXIETY QUESTIONNAIRES
4. TROUBLE RELAXING: NOT AT ALL
5. BEING SO RESTLESS THAT IT IS HARD TO SIT STILL: NOT AT ALL
3. WORRYING TOO MUCH ABOUT DIFFERENT THINGS: NOT AT ALL
2. NOT BEING ABLE TO STOP OR CONTROL WORRYING: NOT AT ALL
6. BECOMING EASILY ANNOYED OR IRRITABLE: SEVERAL DAYS
GAD7 TOTAL SCORE: 2
7. FEELING AFRAID AS IF SOMETHING AWFUL MIGHT HAPPEN: NOT AT ALL
1. FEELING NERVOUS, ANXIOUS, OR ON EDGE: SEVERAL DAYS

## 2024-11-19 NOTE — PATIENT INSTRUCTIONS
[facility] [phone]  OUTPATIENT SERVICES [phone]  [facility]  [phone]    Corporate Offices  [address] [place] [address]  Phone [phone] or [phone]  Fax [phone] [facility]  [address] [place] [address]  Phone [phone]  Fax [phone] [facility]  [address] [place] [address]  Phone [phone]  Fax [phone] [facility] Integrated Supports  [address] [place] [address]  Phone [phone] or [phone]  Fax [phone] [facility]  [address] [place] [address]  Phone [phone]  Fax [phone] [facility]  [address] [place] [address]  Phone [phone]  Fax [phone] [facility] Prevention & Education [address] [address]  Phone [phone]  Fax [phone] [facility]  [address] [place] [address]  Phone [phone]  Fax [phone] [facility]  [address] [address]  Phone [phone]  Fax [phone] [facility]  [address] [place] [address]  Phone [phone]  Fax [phone] [facility] Community  Supports Program  [address] [place] [address]  Phone [phone]  Fax [phone] [facility]  [address], [place] [address]  [phone] [facility]   [address] [place], [place] [address] [phone]

## 2024-11-19 NOTE — PROGRESS NOTES
[facility]  [place] [id number] [facility]  [place] [address]  Dept: [phone]  Dept Fax: [phone]  Loc: [phone]    Geovany Mckeon is a 29 y.o. male who presents today for his medical conditions/complaints as noted below.  Geovany Mckeon is c/o of Follow-up (4-week-f/u.  Anger and anxiety on Lamictal and Buspar. Patient feels good with medications and has achieved 100 mg of Lamictal and TID on Buspar.  Wife feels patient still needs some medication adjustment and/or change the treatment.  ), Depression (Screening performed by patient.  ), and Hypertension (BP elevated this morning however patient states morning was rough with his partner.  )        HPI:     HPI this 29-year-old male presents today for follow-up on his anger issues.  States he feels that he is doing pretty good and feels much better on his medication regimen.  States he now has control of his anger most of the time.  States that his wife tells him that he wants she she wants him to be off of the medicine because she feels like he is treating her bad well on it.  Patient states he feels that this is because what he normally held in and tried not to explode on he is now standing up for himself and speaking these things.  He states he feels comfortable now with being able to discuss things that normally made him mad instead of just going often anger states he has tried to use different words and approach in a different manner but states she is only taking it as it is against her.  He states that she recently had a partial hysterectomy and did not know if she was having hormone shifts because they did leave her ovaries .  Patient states she feels like most of his anxiety is well-controlled on the BuSpar.  States she takes it 2-3 times a day.  He denies any SI or HI he denies any side effect he does report that he was previously on Prozac and it caused him caused SI where he attempted to blow his brains out.  Chief

## 2024-11-20 ENCOUNTER — TELEPHONE (OUTPATIENT)
Dept: PRIMARY CARE CLINIC | Age: 29
End: 2024-11-20

## 2024-11-20 NOTE — TELEPHONE ENCOUNTER
WM calling about Wellbutrin.  mg was sent in to take 1 x 3 days then increase to twice daily. Pharmacy states twice a day is SR or do you want to do 300 mg XL after 3 days?

## 2024-11-21 RX ORDER — BUPROPION HYDROCHLORIDE 150 MG/1
150 TABLET, FILM COATED, EXTENDED RELEASE ORAL 2 TIMES DAILY
Qty: 180 TABLET | Refills: 1 | Status: SHIPPED | OUTPATIENT
Start: 2024-11-21

## 2024-12-13 RX ORDER — CETIRIZINE HYDROCHLORIDE 10 MG/1
10 TABLET ORAL DAILY
Qty: 30 TABLET | Refills: 3 | Status: SHIPPED | OUTPATIENT
Start: 2024-12-13

## 2024-12-30 ENCOUNTER — OFFICE VISIT (OUTPATIENT)
Dept: PRIMARY CARE CLINIC | Age: 29
End: 2024-12-30
Payer: MEDICAID

## 2024-12-30 VITALS
OXYGEN SATURATION: 99 % | DIASTOLIC BLOOD PRESSURE: 78 MMHG | HEIGHT: 72 IN | BODY MASS INDEX: 20.99 KG/M2 | TEMPERATURE: 98.2 F | HEART RATE: 85 BPM | WEIGHT: 155 LBS | SYSTOLIC BLOOD PRESSURE: 120 MMHG | RESPIRATION RATE: 18 BRPM

## 2024-12-30 DIAGNOSIS — R45.4 ANGER: ICD-10-CM

## 2024-12-30 DIAGNOSIS — Z11.3 SCREENING FOR STD (SEXUALLY TRANSMITTED DISEASE): ICD-10-CM

## 2024-12-30 DIAGNOSIS — H65.02 ACUTE SEROUS OTITIS MEDIA OF LEFT EAR, RECURRENCE NOT SPECIFIED: Primary | ICD-10-CM

## 2024-12-30 DIAGNOSIS — F41.9 ANXIETY AND DEPRESSION: ICD-10-CM

## 2024-12-30 DIAGNOSIS — S39.012D STRAIN OF LUMBAR REGION, SUBSEQUENT ENCOUNTER: ICD-10-CM

## 2024-12-30 DIAGNOSIS — F32.A ANXIETY AND DEPRESSION: ICD-10-CM

## 2024-12-30 PROBLEM — S39.012A LUMBAR STRAIN: Status: ACTIVE | Noted: 2024-12-30

## 2024-12-30 LAB
C TRACH DNA UR QL NAA+PROBE: NOT DETECTED
HCV AB SERPL QL IA: NORMAL
HIV-1 P24 AG: NORMAL
HIV1+2 AB SERPLBLD QL IA.RAPID: NORMAL
N GONORRHOEA DNA UR QL NAA+PROBE: NOT DETECTED
T VAGINALIS DNA UR QL NAA+PROBE: NOT DETECTED

## 2024-12-30 PROCEDURE — G8420 CALC BMI NORM PARAMETERS: HCPCS | Performed by: NURSE PRACTITIONER

## 2024-12-30 PROCEDURE — 99214 OFFICE O/P EST MOD 30 MIN: CPT | Performed by: NURSE PRACTITIONER

## 2024-12-30 PROCEDURE — G8484 FLU IMMUNIZE NO ADMIN: HCPCS | Performed by: NURSE PRACTITIONER

## 2024-12-30 PROCEDURE — G8427 DOCREV CUR MEDS BY ELIG CLIN: HCPCS | Performed by: NURSE PRACTITIONER

## 2024-12-30 PROCEDURE — 1036F TOBACCO NON-USER: CPT | Performed by: NURSE PRACTITIONER

## 2024-12-30 RX ORDER — FLUTICASONE PROPIONATE 50 MCG
2 SPRAY, SUSPENSION (ML) NASAL DAILY
Qty: 16 G | Refills: 0 | Status: SHIPPED | OUTPATIENT
Start: 2024-12-30

## 2024-12-30 RX ORDER — BUPROPION HYDROCHLORIDE 150 MG/1
150 TABLET, FILM COATED, EXTENDED RELEASE ORAL DAILY
Qty: 180 TABLET | Refills: 1 | Status: SHIPPED | OUTPATIENT
Start: 2024-12-30

## 2024-12-30 RX ORDER — BUPROPION HYDROCHLORIDE 150 MG/1
150 TABLET, EXTENDED RELEASE ORAL 2 TIMES DAILY
COMMUNITY
Start: 2024-12-18 | End: 2024-12-30

## 2024-12-30 RX ORDER — IBUPROFEN 800 MG/1
800 TABLET, FILM COATED ORAL
Qty: 90 TABLET | Refills: 1 | Status: SHIPPED | OUTPATIENT
Start: 2024-12-30

## 2024-12-30 ASSESSMENT — ENCOUNTER SYMPTOMS
ALLERGIC/IMMUNOLOGIC NEGATIVE: 1
SHORTNESS OF BREATH: 0
GASTROINTESTINAL NEGATIVE: 1
COUGH: 0
WHEEZING: 0
RESPIRATORY NEGATIVE: 1
EYES NEGATIVE: 1

## 2024-12-30 NOTE — PATIENT INSTRUCTIONS
Flonase one spray each side twice a day for 2 weeks  Good antihistamine   Sprain/strain   First 24 hours, use ice to injury site for 15 minutes at a time.  After 48 hours, may alternate ice/heat 15 minutes each.  R.I. C.E. therapy = rest, ice, compression and elevation.  May use ace wrap to injured area for compression.  Use Ibuprofen or other antiinflammatory for pain and inflammation.   If no open skin areas, OTC topical lidocaine such as Icy Hot or capsaicin creams may be applied for pain relief.  Slow stretches of area may help reduce spasms and improve range of motion.   Alternate Voltaren cream with Biofreeze gel to site 2 to 3 times a day .

## 2024-12-30 NOTE — PROGRESS NOTES
Alternate Voltaren cream with Biofreeze gel to site 2 to 3 times a day .          Patient given educational materials -see patient instructions.  Discussed use, benefit, and side effects of prescribed medications.  All patient questions answered.  Pt voiced understanding. Reviewed health maintenance.  Instructed to continue currentmedications, diet and exercise.  Patient agreed with treatment plan. Follow up as directed.   MEDICATIONS:  Orders Placed This Encounter   Medications    buPROPion (ZYBAN) 150 MG extended release tablet     Sig: Take 1 tablet by mouth daily     Dispense:  180 tablet     Refill:  1    ibuprofen (ADVIL;MOTRIN) 800 MG tablet     Sig: Take 1 tablet by mouth 3 times daily (with meals)     Dispense:  90 tablet     Refill:  1    tiZANidine (ZANAFLEX) 4 MG tablet     Sig: Take 1 tablet by mouth every 8 hours as needed (muscle spasm)     Dispense:  90 tablet     Refill:  0    fluticasone (FLONASE) 50 MCG/ACT nasal spray     Si sprays by Each Nostril route daily     Dispense:  16 g     Refill:  0         ORDERS:  Orders Placed This Encounter   Procedures    Chlamydia/N. Gonorrhoeae/T.Vaginalis, Urine    HIV Rapid 1&2    Hepatitis C Antibody    HSV 1, 2 Glyco G-Specific IgG    RPR Reflex to Titer and TPPA       Follow-up:  No follow-ups on file.    PATIENT INSTRUCTIONS:  Patient Instructions   Flonase one spray each side twice a day for 2 weeks  Good antihistamine   Sprain/strain   First 24 hours, use ice to injury site for 15 minutes at a time.  After 48 hours, may alternate ice/heat 15 minutes each.  R.I. C.E. therapy = rest, ice, compression and elevation.  May use ace wrap to injured area for compression.  Use Ibuprofen or other antiinflammatory for pain and inflammation.   If no open skin areas, OTC topical lidocaine such as Icy Hot or capsaicin creams may be applied for pain relief.  Slow stretches of area may help reduce spasms and improve range of motion.   Alternate Voltaren cream with

## 2024-12-31 LAB
HSV1 GG IGG SER-ACNC: 0.56 IV
HSV2 GG IGG SER-ACNC: 0.4 IV

## 2025-01-02 LAB — RPR SER QL: NORMAL

## 2025-01-22 RX ORDER — SUMATRIPTAN SUCCINATE 25 MG/1
25 TABLET ORAL DAILY PRN
Qty: 9 TABLET | Refills: 0 | Status: SHIPPED | OUTPATIENT
Start: 2025-01-22

## 2025-01-22 RX ORDER — LOSARTAN POTASSIUM AND HYDROCHLOROTHIAZIDE 12.5; 1 MG/1; MG/1
1 TABLET ORAL DAILY
Qty: 90 TABLET | Refills: 1 | Status: SHIPPED | OUTPATIENT
Start: 2025-01-22

## 2025-01-28 ENCOUNTER — OFFICE VISIT (OUTPATIENT)
Dept: PRIMARY CARE CLINIC | Age: 30
End: 2025-01-28
Payer: MEDICAID

## 2025-01-28 VITALS
DIASTOLIC BLOOD PRESSURE: 74 MMHG | BODY MASS INDEX: 20.7 KG/M2 | OXYGEN SATURATION: 96 % | TEMPERATURE: 98.4 F | WEIGHT: 152.6 LBS | SYSTOLIC BLOOD PRESSURE: 122 MMHG | HEART RATE: 106 BPM

## 2025-01-28 DIAGNOSIS — R63.0 DECREASED APPETITE: ICD-10-CM

## 2025-01-28 DIAGNOSIS — R63.4 UNINTENTIONAL WEIGHT LOSS: ICD-10-CM

## 2025-01-28 DIAGNOSIS — R45.4 ANGER: Primary | ICD-10-CM

## 2025-01-28 PROCEDURE — 1036F TOBACCO NON-USER: CPT | Performed by: NURSE PRACTITIONER

## 2025-01-28 PROCEDURE — 99214 OFFICE O/P EST MOD 30 MIN: CPT | Performed by: NURSE PRACTITIONER

## 2025-01-28 PROCEDURE — G8427 DOCREV CUR MEDS BY ELIG CLIN: HCPCS | Performed by: NURSE PRACTITIONER

## 2025-01-28 PROCEDURE — G8420 CALC BMI NORM PARAMETERS: HCPCS | Performed by: NURSE PRACTITIONER

## 2025-01-28 RX ORDER — PANTOPRAZOLE SODIUM 40 MG/1
40 TABLET, DELAYED RELEASE ORAL
Qty: 30 TABLET | Refills: 5 | Status: SHIPPED | OUTPATIENT
Start: 2025-01-28

## 2025-01-28 RX ORDER — BUPROPION HYDROCHLORIDE 150 MG/1
150 TABLET, EXTENDED RELEASE ORAL 2 TIMES DAILY
COMMUNITY
Start: 2025-01-13 | End: 2025-01-28 | Stop reason: SINTOL

## 2025-01-28 SDOH — ECONOMIC STABILITY: FOOD INSECURITY: WITHIN THE PAST 12 MONTHS, THE FOOD YOU BOUGHT JUST DIDN'T LAST AND YOU DIDN'T HAVE MONEY TO GET MORE.: NEVER TRUE

## 2025-01-28 SDOH — ECONOMIC STABILITY: FOOD INSECURITY: WITHIN THE PAST 12 MONTHS, YOU WORRIED THAT YOUR FOOD WOULD RUN OUT BEFORE YOU GOT MONEY TO BUY MORE.: NEVER TRUE

## 2025-01-28 ASSESSMENT — PATIENT HEALTH QUESTIONNAIRE - PHQ9
SUM OF ALL RESPONSES TO PHQ QUESTIONS 1-9: 15
2. FEELING DOWN, DEPRESSED OR HOPELESS: SEVERAL DAYS
SUM OF ALL RESPONSES TO PHQ QUESTIONS 1-9: 15
1. LITTLE INTEREST OR PLEASURE IN DOING THINGS: NEARLY EVERY DAY
7. TROUBLE CONCENTRATING ON THINGS, SUCH AS READING THE NEWSPAPER OR WATCHING TELEVISION: NOT AT ALL
6. FEELING BAD ABOUT YOURSELF - OR THAT YOU ARE A FAILURE OR HAVE LET YOURSELF OR YOUR FAMILY DOWN: NOT AT ALL
9. THOUGHTS THAT YOU WOULD BE BETTER OFF DEAD, OR OF HURTING YOURSELF: NOT AT ALL
SUM OF ALL RESPONSES TO PHQ9 QUESTIONS 1 & 2: 4
10. IF YOU CHECKED OFF ANY PROBLEMS, HOW DIFFICULT HAVE THESE PROBLEMS MADE IT FOR YOU TO DO YOUR WORK, TAKE CARE OF THINGS AT HOME, OR GET ALONG WITH OTHER PEOPLE: VERY DIFFICULT
5. POOR APPETITE OR OVEREATING: NEARLY EVERY DAY
4. FEELING TIRED OR HAVING LITTLE ENERGY: NEARLY EVERY DAY
3. TROUBLE FALLING OR STAYING ASLEEP: NEARLY EVERY DAY
8. MOVING OR SPEAKING SO SLOWLY THAT OTHER PEOPLE COULD HAVE NOTICED. OR THE OPPOSITE, BEING SO FIGETY OR RESTLESS THAT YOU HAVE BEEN MOVING AROUND A LOT MORE THAN USUAL: MORE THAN HALF THE DAYS
SUM OF ALL RESPONSES TO PHQ QUESTIONS 1-9: 15
SUM OF ALL RESPONSES TO PHQ QUESTIONS 1-9: 15

## 2025-01-28 NOTE — PROGRESS NOTES
[facility]  [address]  [place] [address]  Dept: [phone]  Dept Fax: [phone]  Loc: [phone]    Geovany Mckeon is a 29 y.o. male who presents today for his medical conditions/complaints as noted below.  Geovany Mckeon is c/o of Medication Management (Wants to discuss meds strictly with PJ )        HPI:     HPI this 29-year-old male presents today for adjustment of his medication.  States that he is just feels like he is having more issues with anger.  States he is concerned that he do the Wellbutrin or levomethadyl.  States that he is having other side effects.  He would like to see about changing up.  Also reports that he is not eating he has no appetite.  States he is losing weight.  He is not sure he thinks this is a side effect of medication.  Chief Complaint   Patient presents with    Medication Management     Wants to discuss meds strictly with PJ      Past Medical History:   Diagnosis Date    Anxiety     Epilepsy (HCC)       No past surgical history on file.        1/28/2025     2:54 PM 12/30/2024     9:55 AM 11/19/2024    10:11 AM 11/19/2024     9:35 AM 10/24/2024     9:05 AM 9/19/2024     3:53 PM   Vitals   SYSTOLIC 122 120 126 146 138 122   DIASTOLIC 74 78 84 80 86 86   Site  Left Upper Arm Left Upper Arm Left Upper Arm  Left Upper Arm   Position  Sitting Sitting Sitting  Sitting   Cuff Size  Medium Adult Large Adult   Medium Adult   Pulse 106 85  84 88 82   Temp 98.4 °F (36.9 °C) 98.2 °F (36.8 °C)  97.4 °F (36.3 °C) 98.3 °F (36.8 °C) 97 °F (36.1 °C)   Respirations  18  16  18   SpO2 96 % 99 %  98 % 99 % 98 %   Weight - Scale 152 lb 9.6 oz 155 lb  163 lb 158 lb 154 lb 6.4 oz   Height  6' 0\"  6' 0\"  6' 0\"   Body Mass Index  21.02 kg/m2  22.11 kg/m2  20.94 kg/m2       Family History   Problem Relation Age of Onset    High Blood Pressure Father     Diabetes Maternal Uncle     Diabetes Paternal Uncle     Diabetes Maternal Grandmother     Alzheimer's

## 2025-01-28 NOTE — PATIENT INSTRUCTIONS
Wean off of wellbutrin to 150 mg daily for one week then every other day for one week then stop.     Increase lamictal to 100 in am and 50 in PM

## 2025-02-03 PROBLEM — R63.4 UNINTENTIONAL WEIGHT LOSS: Status: ACTIVE | Noted: 2025-02-03

## 2025-02-03 PROBLEM — R63.0 DECREASED APPETITE: Status: ACTIVE | Noted: 2025-02-03

## 2025-02-03 ASSESSMENT — ENCOUNTER SYMPTOMS
ALLERGIC/IMMUNOLOGIC NEGATIVE: 1
GASTROINTESTINAL NEGATIVE: 1
WHEEZING: 0
COUGH: 0
RESPIRATORY NEGATIVE: 1
EYES NEGATIVE: 1
SHORTNESS OF BREATH: 0

## 2025-02-18 RX ORDER — BUSPIRONE HYDROCHLORIDE 10 MG/1
10 TABLET ORAL 3 TIMES DAILY
Qty: 90 TABLET | Refills: 1 | Status: SHIPPED | OUTPATIENT
Start: 2025-02-18

## 2025-02-27 RX ORDER — LAMOTRIGINE 100 MG/1
100 TABLET ORAL DAILY
Qty: 90 TABLET | Refills: 2 | Status: SHIPPED | OUTPATIENT
Start: 2025-02-27

## 2025-03-03 RX ORDER — LAMOTRIGINE 25 MG/1
TABLET ORAL
Qty: 60 TABLET | Refills: 3 | Status: SHIPPED | OUTPATIENT
Start: 2025-03-03

## 2025-03-08 ENCOUNTER — OFFICE VISIT (OUTPATIENT)
Age: 30
End: 2025-03-08
Payer: MEDICAID

## 2025-03-08 VITALS
BODY MASS INDEX: 22.38 KG/M2 | WEIGHT: 165 LBS | HEART RATE: 94 BPM | SYSTOLIC BLOOD PRESSURE: 126 MMHG | DIASTOLIC BLOOD PRESSURE: 72 MMHG | OXYGEN SATURATION: 98 % | TEMPERATURE: 98.5 F | RESPIRATION RATE: 18 BRPM

## 2025-03-08 DIAGNOSIS — L08.9 SKIN INFECTION: Primary | ICD-10-CM

## 2025-03-08 PROCEDURE — 1036F TOBACCO NON-USER: CPT

## 2025-03-08 PROCEDURE — G8427 DOCREV CUR MEDS BY ELIG CLIN: HCPCS

## 2025-03-08 PROCEDURE — 99213 OFFICE O/P EST LOW 20 MIN: CPT

## 2025-03-08 PROCEDURE — G8420 CALC BMI NORM PARAMETERS: HCPCS

## 2025-03-08 RX ORDER — CLINDAMYCIN HYDROCHLORIDE 300 MG/1
300 CAPSULE ORAL 3 TIMES DAILY
Qty: 30 CAPSULE | Refills: 0 | Status: SHIPPED | OUTPATIENT
Start: 2025-03-08 | End: 2025-03-18

## 2025-03-08 RX ORDER — MUPIROCIN 20 MG/G
OINTMENT TOPICAL
Qty: 15 G | Refills: 0 | Status: SHIPPED | OUTPATIENT
Start: 2025-03-08

## 2025-03-08 ASSESSMENT — ENCOUNTER SYMPTOMS
ABDOMINAL DISTENTION: 0
EYE ITCHING: 0
SINUS PRESSURE: 0
APNEA: 0
ABDOMINAL PAIN: 0
WHEEZING: 0
NAUSEA: 0
TROUBLE SWALLOWING: 0
COLOR CHANGE: 0
CHEST TIGHTNESS: 0
SINUS PAIN: 0
DIARRHEA: 0
SHORTNESS OF BREATH: 0
EYE DISCHARGE: 0
VOMITING: 0
CONSTIPATION: 0
EYE PAIN: 0
COUGH: 0
SORE THROAT: 0
RHINORRHEA: 0

## 2025-03-08 NOTE — PROGRESS NOTES
[facility]  [facility]  [address] [place] [address] DRIVE  [place] [address]  Dept: [phone]  Dept Fax: [phone]  Loc: [phone]    Geovany Mckeon is a 29 y.o. male who presents today for his medical conditions/complaints as noted below.      HPI:   HPI    Patient presents for evaluation of a right wrist tattoo infection. Patient reports he got this tattoo on 2/27/2025, 9 days ago. Reports he has multiple tattoos and has never had a tattoo heal this way. Denies drainage from the site, fevers, or chills. Reports he has been using an OTC antibacterial ointment with no relief.     Past Medical History:   Diagnosis Date    Anxiety     Epilepsy (HCC)        No past surgical history on file.    Family History   Problem Relation Age of Onset    High Blood Pressure Father     Diabetes Maternal Uncle     Diabetes Paternal Uncle     Diabetes Maternal Grandmother     Alzheimer's Disease Maternal Grandmother     Diabetes Maternal Grandfather     Cancer Maternal Grandfather         pancreatic and prostate    Diabetes Paternal Grandmother     Cancer Paternal Grandfather         melanoma    High Blood Pressure Paternal Grandfather     Diabetes Paternal Grandfather        Social History     Tobacco Use    Smoking status: Never    Smokeless tobacco: Former     Types: Chew     Quit date: 2020   Substance Use Topics    Alcohol use: Yes     Comment: occasionally        Current Outpatient Medications   Medication Sig Dispense Refill    clindamycin (CLEOCIN) 300 MG capsule Take 1 capsule by mouth 3 times daily for 10 days 30 capsule 0    mupirocin (BACTROBAN) 2 % ointment Apply topically 3 times daily. 15 g 0    lamoTRIgine (LAMICTAL) 25 MG tablet Take 50 mg in the PM along with 100 mg in the Am for total daily dose of 150 mg. 60 tablet 3    lamoTRIgine (LAMICTAL) 100 MG tablet Take 1 tablet by mouth once daily 90 tablet 2    busPIRone (BUSPAR) 10 MG tablet TAKE 1 TABLET BY MOUTH THREE

## 2025-03-08 NOTE — PATIENT INSTRUCTIONS
Take full course of oral antibiotic.  Apply mupirocin ointment three times daily after cleansing the area with antibacterial soap (Dial or Chlorhexidine).  Cover area while working outside or doing anything dirty.  Follow-up in the ER for fever, chills, or worsening signs of infection (drainage, increasing redness/swelling).  The patient is to follow up with PCP if symptoms worsen/fail to improve.

## 2025-04-21 RX ORDER — IBUPROFEN 800 MG/1
800 TABLET, FILM COATED ORAL
Qty: 90 TABLET | Refills: 0 | Status: SHIPPED | OUTPATIENT
Start: 2025-04-21

## 2025-04-21 RX ORDER — CETIRIZINE HYDROCHLORIDE 10 MG/1
10 TABLET ORAL DAILY
Qty: 30 TABLET | Refills: 3 | Status: SHIPPED | OUTPATIENT
Start: 2025-04-21

## 2025-05-08 RX ORDER — IBUPROFEN 800 MG/1
800 TABLET, FILM COATED ORAL
Qty: 90 TABLET | Refills: 0 | Status: SHIPPED | OUTPATIENT
Start: 2025-05-08

## 2025-05-12 RX ORDER — SUMATRIPTAN SUCCINATE 25 MG/1
25 TABLET ORAL DAILY PRN
Qty: 9 TABLET | Refills: 3 | Status: SHIPPED | OUTPATIENT
Start: 2025-05-12

## 2025-06-06 ENCOUNTER — OFFICE VISIT (OUTPATIENT)
Dept: PRIMARY CARE CLINIC | Age: 30
End: 2025-06-06
Payer: MEDICAID

## 2025-06-06 VITALS
SYSTOLIC BLOOD PRESSURE: 102 MMHG | HEART RATE: 82 BPM | WEIGHT: 168 LBS | RESPIRATION RATE: 18 BRPM | BODY MASS INDEX: 22.75 KG/M2 | OXYGEN SATURATION: 98 % | TEMPERATURE: 97 F | DIASTOLIC BLOOD PRESSURE: 68 MMHG | HEIGHT: 72 IN

## 2025-06-06 DIAGNOSIS — F52.4 PREMATURE EJACULATION: ICD-10-CM

## 2025-06-06 DIAGNOSIS — T78.40XS ALLERGY, SEQUELA: ICD-10-CM

## 2025-06-06 DIAGNOSIS — J06.9 VIRAL UPPER RESPIRATORY TRACT INFECTION: ICD-10-CM

## 2025-06-06 DIAGNOSIS — F41.9 ANXIETY: Primary | ICD-10-CM

## 2025-06-06 PROBLEM — R68.82 DECREASED LIBIDO: Status: ACTIVE | Noted: 2025-06-06

## 2025-06-06 PROBLEM — T78.40XA ALLERGIES: Status: ACTIVE | Noted: 2025-06-06

## 2025-06-06 PROCEDURE — G8420 CALC BMI NORM PARAMETERS: HCPCS | Performed by: NURSE PRACTITIONER

## 2025-06-06 PROCEDURE — 99214 OFFICE O/P EST MOD 30 MIN: CPT | Performed by: NURSE PRACTITIONER

## 2025-06-06 PROCEDURE — 1036F TOBACCO NON-USER: CPT | Performed by: NURSE PRACTITIONER

## 2025-06-06 PROCEDURE — G8427 DOCREV CUR MEDS BY ELIG CLIN: HCPCS | Performed by: NURSE PRACTITIONER

## 2025-06-06 RX ORDER — PAROXETINE 10 MG/1
10 TABLET, FILM COATED ORAL DAILY
Qty: 30 TABLET | Refills: 3 | Status: SHIPPED | OUTPATIENT
Start: 2025-06-06

## 2025-06-06 RX ORDER — LAMOTRIGINE 100 MG/1
TABLET ORAL
Qty: 90 TABLET | Refills: 2 | Status: SHIPPED | OUTPATIENT
Start: 2025-06-06

## 2025-06-06 RX ORDER — LAMOTRIGINE 25 MG/1
TABLET ORAL
Qty: 60 TABLET | Refills: 3 | Status: SHIPPED | OUTPATIENT
Start: 2025-06-06

## 2025-06-06 RX ORDER — HYDROXYZINE HYDROCHLORIDE 25 MG/1
25 TABLET, FILM COATED ORAL 4 TIMES DAILY PRN
Qty: 120 TABLET | Refills: 0 | Status: SHIPPED | OUTPATIENT
Start: 2025-06-06

## 2025-06-06 ASSESSMENT — ENCOUNTER SYMPTOMS
ALLERGIC/IMMUNOLOGIC NEGATIVE: 1
COUGH: 1
EYES NEGATIVE: 1
GASTROINTESTINAL NEGATIVE: 1
SHORTNESS OF BREATH: 0
WHEEZING: 0

## 2025-06-06 ASSESSMENT — PATIENT HEALTH QUESTIONNAIRE - PHQ9
3. TROUBLE FALLING OR STAYING ASLEEP: NOT AT ALL
10. IF YOU CHECKED OFF ANY PROBLEMS, HOW DIFFICULT HAVE THESE PROBLEMS MADE IT FOR YOU TO DO YOUR WORK, TAKE CARE OF THINGS AT HOME, OR GET ALONG WITH OTHER PEOPLE: SOMEWHAT DIFFICULT
4. FEELING TIRED OR HAVING LITTLE ENERGY: NEARLY EVERY DAY
9. THOUGHTS THAT YOU WOULD BE BETTER OFF DEAD, OR OF HURTING YOURSELF: NOT AT ALL
6. FEELING BAD ABOUT YOURSELF - OR THAT YOU ARE A FAILURE OR HAVE LET YOURSELF OR YOUR FAMILY DOWN: SEVERAL DAYS
SUM OF ALL RESPONSES TO PHQ QUESTIONS 1-9: 8
5. POOR APPETITE OR OVEREATING: MORE THAN HALF THE DAYS
8. MOVING OR SPEAKING SO SLOWLY THAT OTHER PEOPLE COULD HAVE NOTICED. OR THE OPPOSITE, BEING SO FIGETY OR RESTLESS THAT YOU HAVE BEEN MOVING AROUND A LOT MORE THAN USUAL: NOT AT ALL
SUM OF ALL RESPONSES TO PHQ QUESTIONS 1-9: 8
1. LITTLE INTEREST OR PLEASURE IN DOING THINGS: MORE THAN HALF THE DAYS
SUM OF ALL RESPONSES TO PHQ QUESTIONS 1-9: 8
SUM OF ALL RESPONSES TO PHQ QUESTIONS 1-9: 8
2. FEELING DOWN, DEPRESSED OR HOPELESS: NOT AT ALL
7. TROUBLE CONCENTRATING ON THINGS, SUCH AS READING THE NEWSPAPER OR WATCHING TELEVISION: NOT AT ALL

## 2025-06-06 NOTE — PROGRESS NOTES
[facility]  [place] [address]  [facility] [address]  [phone]       Geovany Mckeon is a 29 y.o. male who presents today for his medical conditions/complaints as noted below.  Geovany Mckeon is c/o of Follow-up (Pt is here to discuss increasing allergy medication and just follow up in general. Some concerns. )        HPI:     History of Present Illness  The patient presents for evaluation of anxiety, erectile dysfunction, and allergies.    He reports a persistent cough that has been present for 2 to 3 weeks, which he managed with NyQuil. The cough was particularly bothersome at night, necessitating the use of Vicks VapoRub for relief. Difficulty in lying flat due to a sensation of suffocation led him to sleep propped up on a couch arm. He is currently on Singulair 10 mg at night, Zyrtec 10 mg daily, and Flonase 2 sprays each nostril twice a day. However, he has been using Flonase once daily or as needed.    He expresses concern about potential side effects of Lamictal, including decreased libido and weight gain. He has experienced a weight gain of approximately 10 pounds, despite a reduced appetite. He has previously tried over-the-counter supplements for erectile dysfunction, with uncertain efficacy. He is currently on Lamictal 50 mg in the evening and is considering increasing the dose.    He reports experiencing severe nausea and dizziness approximately 30 minutes after taking BuSpar, leading him to discontinue its use for a week. Upon resuming the medication as needed, he experienced the same symptoms. He has not previously tried hydroxyzine for his anxiety.       Chief Complaint   Patient presents with    Follow-up     Pt is here to discuss increasing allergy medication and just follow up in general. Some concerns.      Past Medical History:   Diagnosis Date    Anxiety     Epilepsy (HCC)       No past surgical history on file.        6/6/2025     1:55 PM

## 2025-07-11 ENCOUNTER — OFFICE VISIT (OUTPATIENT)
Dept: PRIMARY CARE CLINIC | Age: 30
End: 2025-07-11
Payer: MEDICAID

## 2025-07-11 VITALS
SYSTOLIC BLOOD PRESSURE: 120 MMHG | HEART RATE: 95 BPM | RESPIRATION RATE: 18 BRPM | DIASTOLIC BLOOD PRESSURE: 74 MMHG | HEIGHT: 72 IN | BODY MASS INDEX: 26.68 KG/M2 | OXYGEN SATURATION: 97 % | WEIGHT: 197 LBS | TEMPERATURE: 98.1 F

## 2025-07-11 DIAGNOSIS — B07.9 VIRAL WARTS, UNSPECIFIED TYPE: ICD-10-CM

## 2025-07-11 DIAGNOSIS — R53.82 CHRONIC FATIGUE: Primary | ICD-10-CM

## 2025-07-11 DIAGNOSIS — N52.9 ERECTILE DYSFUNCTION, UNSPECIFIED ERECTILE DYSFUNCTION TYPE: ICD-10-CM

## 2025-07-11 DIAGNOSIS — F41.9 ANXIETY: ICD-10-CM

## 2025-07-11 DIAGNOSIS — R45.4 ANGER: ICD-10-CM

## 2025-07-11 PROCEDURE — G8419 CALC BMI OUT NRM PARAM NOF/U: HCPCS | Performed by: NURSE PRACTITIONER

## 2025-07-11 PROCEDURE — G8427 DOCREV CUR MEDS BY ELIG CLIN: HCPCS | Performed by: NURSE PRACTITIONER

## 2025-07-11 PROCEDURE — 99214 OFFICE O/P EST MOD 30 MIN: CPT | Performed by: NURSE PRACTITIONER

## 2025-07-11 PROCEDURE — 1036F TOBACCO NON-USER: CPT | Performed by: NURSE PRACTITIONER

## 2025-07-11 RX ORDER — PAROXETINE HYDROCHLORIDE HEMIHYDRATE 12.5 MG/1
12.5 TABLET, FILM COATED, EXTENDED RELEASE ORAL DAILY
Qty: 30 TABLET | Refills: 3 | Status: SHIPPED | OUTPATIENT
Start: 2025-07-11

## 2025-07-11 RX ORDER — TADALAFIL 5 MG/1
10 TABLET ORAL DAILY
Qty: 60 TABLET | Refills: 0 | Status: SHIPPED | OUTPATIENT
Start: 2025-07-11

## 2025-07-11 ASSESSMENT — ENCOUNTER SYMPTOMS
COUGH: 0
ALLERGIC/IMMUNOLOGIC NEGATIVE: 1
WHEEZING: 0
RESPIRATORY NEGATIVE: 1
SHORTNESS OF BREATH: 0
GASTROINTESTINAL NEGATIVE: 1
EYES NEGATIVE: 1

## 2025-07-11 NOTE — PROGRESS NOTES
[facility]  [place] [address]  [facility] [id number] [phone]       Geovany Mckeon is a 29 y.o. male who presents today for his medical conditions/complaints as noted below.  Geovany Mckeon is c/o of Follow-up (Pt is here for 6 week follow up on anxiety. Pt states the paxil is working. Pt has noticed more fatigue. He states all he wants to do is sleep and falls asleep when he's sitting on the couch and having a hard time getting up in the mornings. ), Referral - General (Pt wants a referral to derm for wart removal on his hands. ), and Discuss Medications (Pt would like to talk about meds. )        HPI:     History of Present Illness  The patient presents for evaluation of fatigue, erectile dysfunction, and skin lesions.    He reports experiencing fatigue, which he suspects might be a side effect of his medication. He has been taking hydroxyzine nightly as prescribed and occasionally during the day as needed. The medication appears to be effective, reducing his anxiety levels from an 11 to a 5. He has also started a daily vitamin D supplement regimen. His hydration is adequate, consuming approximately 1.5 liters of water daily. His weight has fluctuated, peaking at around 168 pounds before stabilizing at approximately 160 pounds.    He has increased his Cialis dosage from half a tablet to a full tablet at night, but this has not resulted in any noticeable improvement. He has not previously tried Viagra. He experiences premature ejaculation, which occurs rapidly and then ceases, although he is able to resume activity afterward.    He has several skin lesions that have not responded to over-the-counter treatments or freezing methods. He is considering surgical removal of these lesions.       Chief Complaint   Patient presents with    Follow-up     Pt is here for 6 week follow up on anxiety. Pt states the paxil is working. Pt has noticed more fatigue. He states all

## 2025-07-21 RX ORDER — LOSARTAN POTASSIUM AND HYDROCHLOROTHIAZIDE 12.5; 1 MG/1; MG/1
1 TABLET ORAL DAILY
Qty: 90 TABLET | Refills: 1 | Status: SHIPPED | OUTPATIENT
Start: 2025-07-21

## 2025-08-25 DIAGNOSIS — N52.9 ERECTILE DYSFUNCTION, UNSPECIFIED ERECTILE DYSFUNCTION TYPE: ICD-10-CM

## 2025-08-25 RX ORDER — CETIRIZINE HYDROCHLORIDE 10 MG/1
10 TABLET ORAL DAILY
Qty: 30 TABLET | Refills: 0 | Status: SHIPPED | OUTPATIENT
Start: 2025-08-25

## 2025-08-25 RX ORDER — TADALAFIL 5 MG/1
10 TABLET ORAL DAILY
Qty: 60 TABLET | Refills: 0 | Status: SHIPPED | OUTPATIENT
Start: 2025-08-25

## 2025-08-25 RX ORDER — PANTOPRAZOLE SODIUM 40 MG/1
TABLET, DELAYED RELEASE ORAL
Qty: 30 TABLET | Refills: 0 | Status: SHIPPED | OUTPATIENT
Start: 2025-08-25

## 2025-08-25 RX ORDER — HYDROXYZINE HYDROCHLORIDE 25 MG/1
25 TABLET, FILM COATED ORAL 4 TIMES DAILY PRN
Qty: 120 TABLET | Refills: 0 | Status: SHIPPED | OUTPATIENT
Start: 2025-08-25

## 2025-08-25 RX ORDER — LAMOTRIGINE 25 MG/1
TABLET ORAL
Qty: 90 TABLET | Refills: 0 | Status: SHIPPED | OUTPATIENT
Start: 2025-08-25

## 2025-08-28 RX ORDER — SUMATRIPTAN SUCCINATE 25 MG/1
25 TABLET ORAL DAILY PRN
Qty: 9 TABLET | Refills: 3 | Status: SHIPPED | OUTPATIENT
Start: 2025-08-28